# Patient Record
Sex: MALE | Race: WHITE | NOT HISPANIC OR LATINO | Employment: OTHER | URBAN - METROPOLITAN AREA
[De-identification: names, ages, dates, MRNs, and addresses within clinical notes are randomized per-mention and may not be internally consistent; named-entity substitution may affect disease eponyms.]

---

## 2021-05-28 ENCOUNTER — OFFICE VISIT (OUTPATIENT)
Dept: FAMILY MEDICINE | Facility: CLINIC | Age: 66
End: 2021-05-28
Payer: COMMERCIAL

## 2021-05-28 VITALS
WEIGHT: 260 LBS | OXYGEN SATURATION: 95 % | RESPIRATION RATE: 14 BRPM | HEIGHT: 66 IN | BODY MASS INDEX: 41.78 KG/M2 | HEART RATE: 65 BPM | SYSTOLIC BLOOD PRESSURE: 116 MMHG | DIASTOLIC BLOOD PRESSURE: 66 MMHG

## 2021-05-28 DIAGNOSIS — D64.9 CHRONIC ANEMIA: ICD-10-CM

## 2021-05-28 DIAGNOSIS — Z09 HOSPITAL DISCHARGE FOLLOW-UP: Primary | ICD-10-CM

## 2021-05-28 DIAGNOSIS — Z95.1 HX OF CABG: ICD-10-CM

## 2021-05-28 DIAGNOSIS — E61.1 IRON DEFICIENCY: ICD-10-CM

## 2021-05-28 DIAGNOSIS — N17.9 ACUTE KIDNEY INJURY: ICD-10-CM

## 2021-05-28 DIAGNOSIS — E11.9 TYPE 2 DIABETES MELLITUS WITHOUT COMPLICATION, WITH LONG-TERM CURRENT USE OF INSULIN: ICD-10-CM

## 2021-05-28 DIAGNOSIS — Z98.84 HISTORY OF ROUX-EN-Y GASTRIC BYPASS: ICD-10-CM

## 2021-05-28 DIAGNOSIS — E78.5 HYPERLIPIDEMIA, UNSPECIFIED HYPERLIPIDEMIA TYPE: ICD-10-CM

## 2021-05-28 DIAGNOSIS — Z79.4 TYPE 2 DIABETES MELLITUS WITHOUT COMPLICATION, WITH LONG-TERM CURRENT USE OF INSULIN: ICD-10-CM

## 2021-05-28 DIAGNOSIS — Z74.09 IMMOBILITY: ICD-10-CM

## 2021-05-28 DIAGNOSIS — M13.0 POLYARTHRITIS: ICD-10-CM

## 2021-05-28 DIAGNOSIS — I25.10 CORONARY ARTERY DISEASE, ANGINA PRESENCE UNSPECIFIED, UNSPECIFIED VESSEL OR LESION TYPE, UNSPECIFIED WHETHER NATIVE OR TRANSPLANTED HEART: ICD-10-CM

## 2021-05-28 LAB — HBA1C MFR BLD: 7.2 % (ref 4–6)

## 2021-05-28 PROCEDURE — 99205 PR OFFICE/OUTPT VISIT, NEW, LEVL V, 60-74 MIN: ICD-10-PCS | Mod: S$GLB,,, | Performed by: NURSE PRACTITIONER

## 2021-05-28 PROCEDURE — 99205 OFFICE O/P NEW HI 60 MIN: CPT | Mod: S$GLB,,, | Performed by: NURSE PRACTITIONER

## 2021-05-28 RX ORDER — GLIMEPIRIDE 4 MG/1
TABLET ORAL
COMMUNITY
Start: 2021-02-15 | End: 2021-06-29 | Stop reason: SDUPTHER

## 2021-05-28 RX ORDER — OMEPRAZOLE 20 MG/1
CAPSULE, DELAYED RELEASE ORAL
COMMUNITY
Start: 2021-04-12 | End: 2021-06-29 | Stop reason: SDUPTHER

## 2021-05-28 RX ORDER — FUROSEMIDE 40 MG/1
TABLET ORAL
COMMUNITY
Start: 2021-01-24 | End: 2021-06-29 | Stop reason: SDUPTHER

## 2021-05-28 RX ORDER — INSULIN GLARGINE 100 [IU]/ML
40 INJECTION, SOLUTION SUBCUTANEOUS DAILY
COMMUNITY
End: 2022-01-19 | Stop reason: SDUPTHER

## 2021-05-28 RX ORDER — INSULIN LISPRO 100 [IU]/ML
20 INJECTION, SOLUTION INTRAVENOUS; SUBCUTANEOUS
COMMUNITY
Start: 2021-05-11 | End: 2022-01-19 | Stop reason: SDUPTHER

## 2021-05-28 RX ORDER — DULOXETIN HYDROCHLORIDE 30 MG/1
CAPSULE, DELAYED RELEASE ORAL
COMMUNITY
Start: 2021-04-12 | End: 2021-06-29 | Stop reason: SDUPTHER

## 2021-05-28 RX ORDER — METFORMIN HYDROCHLORIDE 1000 MG/1
TABLET ORAL
COMMUNITY
Start: 2021-04-12 | End: 2021-06-29 | Stop reason: SDUPTHER

## 2021-05-28 RX ORDER — NAPROXEN SODIUM 220 MG/1
81 TABLET, FILM COATED ORAL DAILY
COMMUNITY
End: 2024-03-28

## 2021-05-28 RX ORDER — GABAPENTIN 300 MG/1
300 CAPSULE ORAL 3 TIMES DAILY
COMMUNITY
Start: 2021-05-24 | End: 2022-01-19 | Stop reason: SDUPTHER

## 2021-05-28 RX ORDER — ATORVASTATIN CALCIUM 40 MG/1
TABLET, FILM COATED ORAL
COMMUNITY
Start: 2021-02-15 | End: 2021-06-29 | Stop reason: SDUPTHER

## 2021-05-28 RX ORDER — CARVEDILOL 25 MG/1
TABLET ORAL
COMMUNITY
Start: 2021-02-15 | End: 2021-06-29 | Stop reason: SDUPTHER

## 2021-06-04 ENCOUNTER — TELEPHONE (OUTPATIENT)
Dept: FAMILY MEDICINE | Facility: CLINIC | Age: 66
End: 2021-06-04

## 2021-06-11 ENCOUNTER — OFFICE VISIT (OUTPATIENT)
Dept: FAMILY MEDICINE | Facility: CLINIC | Age: 66
End: 2021-06-11
Payer: COMMERCIAL

## 2021-06-11 DIAGNOSIS — R70.0 ESR RAISED: ICD-10-CM

## 2021-06-11 DIAGNOSIS — E83.42 HYPOMAGNESEMIA: ICD-10-CM

## 2021-06-11 DIAGNOSIS — M13.0 POLYARTHRITIS: ICD-10-CM

## 2021-06-11 DIAGNOSIS — D80.1 HYPOGAMMAGLOBULINEMIA: Primary | ICD-10-CM

## 2021-06-11 DIAGNOSIS — E11.9 TYPE 2 DIABETES MELLITUS WITHOUT COMPLICATION, WITH LONG-TERM CURRENT USE OF INSULIN: ICD-10-CM

## 2021-06-11 DIAGNOSIS — I25.10 CORONARY ARTERY DISEASE, ANGINA PRESENCE UNSPECIFIED, UNSPECIFIED VESSEL OR LESION TYPE, UNSPECIFIED WHETHER NATIVE OR TRANSPLANTED HEART: ICD-10-CM

## 2021-06-11 DIAGNOSIS — D64.9 CHRONIC ANEMIA: ICD-10-CM

## 2021-06-11 DIAGNOSIS — Z79.4 TYPE 2 DIABETES MELLITUS WITHOUT COMPLICATION, WITH LONG-TERM CURRENT USE OF INSULIN: ICD-10-CM

## 2021-06-11 PROCEDURE — 99214 PR OFFICE/OUTPT VISIT, EST, LEVL IV, 30-39 MIN: ICD-10-PCS | Mod: S$GLB,,, | Performed by: NURSE PRACTITIONER

## 2021-06-11 PROCEDURE — 99214 OFFICE O/P EST MOD 30 MIN: CPT | Mod: S$GLB,,, | Performed by: NURSE PRACTITIONER

## 2021-06-17 ENCOUNTER — TELEPHONE (OUTPATIENT)
Dept: FAMILY MEDICINE | Facility: CLINIC | Age: 66
End: 2021-06-17

## 2021-07-01 ENCOUNTER — PATIENT MESSAGE (OUTPATIENT)
Dept: FAMILY MEDICINE | Facility: CLINIC | Age: 66
End: 2021-07-01

## 2021-07-01 RX ORDER — CARVEDILOL 25 MG/1
25 TABLET ORAL 2 TIMES DAILY
Qty: 180 TABLET | Refills: 2 | Status: SHIPPED | OUTPATIENT
Start: 2021-07-01 | End: 2022-01-19 | Stop reason: SDUPTHER

## 2021-07-01 RX ORDER — GLIMEPIRIDE 4 MG/1
4 TABLET ORAL 2 TIMES DAILY
Qty: 180 TABLET | Refills: 2 | Status: SHIPPED | OUTPATIENT
Start: 2021-07-01 | End: 2022-01-19 | Stop reason: SDUPTHER

## 2021-07-01 RX ORDER — METFORMIN HYDROCHLORIDE 1000 MG/1
1000 TABLET ORAL 2 TIMES DAILY WITH MEALS
Qty: 180 TABLET | Refills: 3 | Status: SHIPPED | OUTPATIENT
Start: 2021-07-01 | End: 2022-01-19 | Stop reason: SDUPTHER

## 2021-07-01 RX ORDER — INSULIN GLARGINE 100 [IU]/ML
50 INJECTION, SOLUTION SUBCUTANEOUS DAILY
OUTPATIENT
Start: 2021-07-01

## 2021-07-01 RX ORDER — ATORVASTATIN CALCIUM 40 MG/1
40 TABLET, FILM COATED ORAL NIGHTLY
Qty: 90 TABLET | Refills: 3 | Status: SHIPPED | OUTPATIENT
Start: 2021-07-01 | End: 2022-01-19 | Stop reason: SDUPTHER

## 2021-07-01 RX ORDER — OMEPRAZOLE 20 MG/1
20 CAPSULE, DELAYED RELEASE ORAL 2 TIMES DAILY
Qty: 180 CAPSULE | Refills: 2 | Status: SHIPPED | OUTPATIENT
Start: 2021-07-01 | End: 2022-01-19 | Stop reason: SDUPTHER

## 2021-07-01 RX ORDER — GABAPENTIN 300 MG/1
CAPSULE ORAL
OUTPATIENT
Start: 2021-07-01

## 2021-07-01 RX ORDER — FUROSEMIDE 40 MG/1
40 TABLET ORAL DAILY
Qty: 90 TABLET | Refills: 1 | Status: SHIPPED | OUTPATIENT
Start: 2021-07-01 | End: 2021-12-15 | Stop reason: SDUPTHER

## 2021-07-01 RX ORDER — INSULIN LISPRO 100 [IU]/ML
INJECTION, SOLUTION INTRAVENOUS; SUBCUTANEOUS
OUTPATIENT
Start: 2021-07-01

## 2021-07-01 RX ORDER — DULOXETIN HYDROCHLORIDE 30 MG/1
CAPSULE, DELAYED RELEASE ORAL
Qty: 270 CAPSULE | Refills: 2 | Status: SHIPPED | OUTPATIENT
Start: 2021-07-01 | End: 2022-01-19 | Stop reason: SDUPTHER

## 2021-08-04 ENCOUNTER — PATIENT MESSAGE (OUTPATIENT)
Dept: ADMINISTRATIVE | Facility: HOSPITAL | Age: 66
End: 2021-08-04

## 2021-09-22 ENCOUNTER — PATIENT OUTREACH (OUTPATIENT)
Dept: ADMINISTRATIVE | Facility: HOSPITAL | Age: 66
End: 2021-09-22

## 2021-09-27 ENCOUNTER — OFFICE VISIT (OUTPATIENT)
Dept: FAMILY MEDICINE | Facility: CLINIC | Age: 66
End: 2021-09-27
Payer: COMMERCIAL

## 2021-09-27 VITALS
HEIGHT: 64 IN | HEART RATE: 71 BPM | OXYGEN SATURATION: 96 % | DIASTOLIC BLOOD PRESSURE: 74 MMHG | BODY MASS INDEX: 50.02 KG/M2 | SYSTOLIC BLOOD PRESSURE: 155 MMHG | WEIGHT: 293 LBS

## 2021-09-27 DIAGNOSIS — I25.10 CORONARY ARTERY DISEASE, ANGINA PRESENCE UNSPECIFIED, UNSPECIFIED VESSEL OR LESION TYPE, UNSPECIFIED WHETHER NATIVE OR TRANSPLANTED HEART: ICD-10-CM

## 2021-09-27 DIAGNOSIS — E11.22 TYPE 2 DIABETES MELLITUS WITH STAGE 3 CHRONIC KIDNEY DISEASE, WITH LONG-TERM CURRENT USE OF INSULIN, UNSPECIFIED WHETHER STAGE 3A OR 3B CKD: Primary | ICD-10-CM

## 2021-09-27 DIAGNOSIS — N18.30 TYPE 2 DIABETES MELLITUS WITH STAGE 3 CHRONIC KIDNEY DISEASE, WITH LONG-TERM CURRENT USE OF INSULIN, UNSPECIFIED WHETHER STAGE 3A OR 3B CKD: Primary | ICD-10-CM

## 2021-09-27 DIAGNOSIS — I10 ESSENTIAL HYPERTENSION: ICD-10-CM

## 2021-09-27 DIAGNOSIS — Z79.4 TYPE 2 DIABETES MELLITUS WITH STAGE 3 CHRONIC KIDNEY DISEASE, WITH LONG-TERM CURRENT USE OF INSULIN, UNSPECIFIED WHETHER STAGE 3A OR 3B CKD: Primary | ICD-10-CM

## 2021-09-27 PROCEDURE — 99214 PR OFFICE/OUTPT VISIT, EST, LEVL IV, 30-39 MIN: ICD-10-PCS | Mod: AQ,S$GLB,, | Performed by: FAMILY MEDICINE

## 2021-09-27 PROCEDURE — 99214 OFFICE O/P EST MOD 30 MIN: CPT | Mod: AQ,S$GLB,, | Performed by: FAMILY MEDICINE

## 2021-09-27 RX ORDER — NITROGLYCERIN 0.4 MG/1
0.4 TABLET SUBLINGUAL EVERY 5 MIN PRN
Qty: 25 TABLET | Refills: 0 | Status: SHIPPED | OUTPATIENT
Start: 2021-09-27 | End: 2022-01-19 | Stop reason: SDUPTHER

## 2021-09-27 RX ORDER — POLYETHYLENE GLYCOL 3350, SODIUM SULFATE ANHYDROUS, SODIUM BICARBONATE, SODIUM CHLORIDE, POTASSIUM CHLORIDE 236; 22.74; 6.74; 5.86; 2.97 G/4L; G/4L; G/4L; G/4L; G/4L
4 POWDER, FOR SOLUTION ORAL ONCE
Qty: 4000 ML | Refills: 0 | Status: SHIPPED | OUTPATIENT
Start: 2021-09-27 | End: 2021-09-27

## 2021-09-27 RX ORDER — FLASH GLUCOSE SENSOR
KIT MISCELLANEOUS
Qty: 6 KIT | Refills: 3 | Status: SHIPPED | OUTPATIENT
Start: 2021-09-27 | End: 2021-10-13 | Stop reason: SDUPTHER

## 2021-09-30 ENCOUNTER — TELEPHONE (OUTPATIENT)
Dept: FAMILY MEDICINE | Facility: CLINIC | Age: 66
End: 2021-09-30

## 2021-10-01 ENCOUNTER — PATIENT MESSAGE (OUTPATIENT)
Dept: FAMILY MEDICINE | Facility: CLINIC | Age: 66
End: 2021-10-01

## 2021-10-01 ENCOUNTER — PATIENT MESSAGE (OUTPATIENT)
Dept: ADMINISTRATIVE | Facility: HOSPITAL | Age: 66
End: 2021-10-01

## 2021-10-06 ENCOUNTER — PATIENT MESSAGE (OUTPATIENT)
Dept: FAMILY MEDICINE | Facility: CLINIC | Age: 66
End: 2021-10-06

## 2021-10-06 DIAGNOSIS — E11.9 TYPE 2 DIABETES MELLITUS WITHOUT COMPLICATION, UNSPECIFIED WHETHER LONG TERM INSULIN USE: ICD-10-CM

## 2021-10-07 RX ORDER — DAPAGLIFLOZIN 5 MG/1
5 TABLET, FILM COATED ORAL DAILY
Qty: 90 TABLET | Refills: 3 | Status: SHIPPED | OUTPATIENT
Start: 2021-10-07 | End: 2021-10-13 | Stop reason: SDUPTHER

## 2021-10-12 ENCOUNTER — PATIENT OUTREACH (OUTPATIENT)
Dept: ADMINISTRATIVE | Facility: HOSPITAL | Age: 66
End: 2021-10-12

## 2021-10-13 ENCOUNTER — PATIENT MESSAGE (OUTPATIENT)
Dept: FAMILY MEDICINE | Facility: CLINIC | Age: 66
End: 2021-10-13
Payer: COMMERCIAL

## 2021-10-13 DIAGNOSIS — E11.22 TYPE 2 DIABETES MELLITUS WITH STAGE 3 CHRONIC KIDNEY DISEASE, WITH LONG-TERM CURRENT USE OF INSULIN, UNSPECIFIED WHETHER STAGE 3A OR 3B CKD: ICD-10-CM

## 2021-10-13 DIAGNOSIS — N18.30 TYPE 2 DIABETES MELLITUS WITH STAGE 3 CHRONIC KIDNEY DISEASE, WITH LONG-TERM CURRENT USE OF INSULIN, UNSPECIFIED WHETHER STAGE 3A OR 3B CKD: ICD-10-CM

## 2021-10-13 DIAGNOSIS — Z79.4 TYPE 2 DIABETES MELLITUS WITH STAGE 3 CHRONIC KIDNEY DISEASE, WITH LONG-TERM CURRENT USE OF INSULIN, UNSPECIFIED WHETHER STAGE 3A OR 3B CKD: ICD-10-CM

## 2021-10-14 RX ORDER — DAPAGLIFLOZIN 5 MG/1
5 TABLET, FILM COATED ORAL DAILY
Qty: 90 TABLET | Refills: 3 | Status: SHIPPED | OUTPATIENT
Start: 2021-10-14 | End: 2022-01-19 | Stop reason: SDUPTHER

## 2021-10-14 RX ORDER — FLASH GLUCOSE SENSOR
KIT MISCELLANEOUS
Qty: 6 KIT | Refills: 3 | Status: SHIPPED | OUTPATIENT
Start: 2021-10-14 | End: 2022-01-19 | Stop reason: SDUPTHER

## 2021-10-25 LAB
COMMENTS: ABNORMAL
EST. AVERAGE GLUCOSE BLD GHB EST-MCNC: 209 MG/DL
HBA1C MFR BLD: 8.9 % (ref 4.8–5.6)

## 2021-11-19 ENCOUNTER — TELEPHONE (OUTPATIENT)
Dept: FAMILY MEDICINE | Facility: CLINIC | Age: 66
End: 2021-11-19
Payer: COMMERCIAL

## 2021-11-24 ENCOUNTER — PATIENT OUTREACH (OUTPATIENT)
Dept: ADMINISTRATIVE | Facility: HOSPITAL | Age: 66
End: 2021-11-24
Payer: COMMERCIAL

## 2021-11-30 ENCOUNTER — PATIENT MESSAGE (OUTPATIENT)
Dept: RESEARCH | Facility: HOSPITAL | Age: 66
End: 2021-11-30
Payer: COMMERCIAL

## 2021-12-01 ENCOUNTER — PATIENT MESSAGE (OUTPATIENT)
Dept: FAMILY MEDICINE | Facility: CLINIC | Age: 66
End: 2021-12-01
Payer: COMMERCIAL

## 2021-12-16 RX ORDER — FUROSEMIDE 40 MG/1
40 TABLET ORAL DAILY
Qty: 90 TABLET | Refills: 1 | Status: SHIPPED | OUTPATIENT
Start: 2021-12-16 | End: 2022-01-19 | Stop reason: SDUPTHER

## 2022-01-19 ENCOUNTER — OFFICE VISIT (OUTPATIENT)
Dept: FAMILY MEDICINE | Facility: CLINIC | Age: 67
End: 2022-01-19
Payer: COMMERCIAL

## 2022-01-19 VITALS
HEART RATE: 91 BPM | RESPIRATION RATE: 17 BRPM | DIASTOLIC BLOOD PRESSURE: 98 MMHG | SYSTOLIC BLOOD PRESSURE: 157 MMHG | OXYGEN SATURATION: 95 %

## 2022-01-19 DIAGNOSIS — E11.22 TYPE 2 DIABETES MELLITUS WITH STAGE 3 CHRONIC KIDNEY DISEASE, WITH LONG-TERM CURRENT USE OF INSULIN, UNSPECIFIED WHETHER STAGE 3A OR 3B CKD: ICD-10-CM

## 2022-01-19 DIAGNOSIS — I25.10 CORONARY ARTERY DISEASE, UNSPECIFIED VESSEL OR LESION TYPE, UNSPECIFIED WHETHER ANGINA PRESENT, UNSPECIFIED WHETHER NATIVE OR TRANSPLANTED HEART: ICD-10-CM

## 2022-01-19 DIAGNOSIS — E78.5 HYPERLIPIDEMIA, UNSPECIFIED HYPERLIPIDEMIA TYPE: ICD-10-CM

## 2022-01-19 DIAGNOSIS — G62.9 POLYNEUROPATHY: ICD-10-CM

## 2022-01-19 DIAGNOSIS — N18.30 TYPE 2 DIABETES MELLITUS WITH STAGE 3 CHRONIC KIDNEY DISEASE, WITH LONG-TERM CURRENT USE OF INSULIN, UNSPECIFIED WHETHER STAGE 3A OR 3B CKD: ICD-10-CM

## 2022-01-19 DIAGNOSIS — I10 PRIMARY HYPERTENSION: Primary | ICD-10-CM

## 2022-01-19 DIAGNOSIS — F41.1 GENERALIZED ANXIETY DISORDER: ICD-10-CM

## 2022-01-19 DIAGNOSIS — H66.90 OTITIS MEDIA, UNSPECIFIED LATERALITY, UNSPECIFIED OTITIS MEDIA TYPE: ICD-10-CM

## 2022-01-19 DIAGNOSIS — K21.9 GASTROESOPHAGEAL REFLUX DISEASE, UNSPECIFIED WHETHER ESOPHAGITIS PRESENT: ICD-10-CM

## 2022-01-19 DIAGNOSIS — Z79.4 TYPE 2 DIABETES MELLITUS WITH STAGE 3 CHRONIC KIDNEY DISEASE, WITH LONG-TERM CURRENT USE OF INSULIN, UNSPECIFIED WHETHER STAGE 3A OR 3B CKD: ICD-10-CM

## 2022-01-19 LAB
CTP QC/QA: YES
SARS-COV-2 RDRP RESP QL NAA+PROBE: NEGATIVE

## 2022-01-19 PROCEDURE — 99215 OFFICE O/P EST HI 40 MIN: CPT | Mod: S$GLB,,, | Performed by: FAMILY MEDICINE

## 2022-01-19 PROCEDURE — 99215 PR OFFICE/OUTPT VISIT, EST, LEVL V, 40-54 MIN: ICD-10-PCS | Mod: S$GLB,,, | Performed by: FAMILY MEDICINE

## 2022-01-19 PROCEDURE — U0002: ICD-10-PCS | Mod: QW,CR,S$GLB, | Performed by: FAMILY MEDICINE

## 2022-01-19 PROCEDURE — U0002 COVID-19 LAB TEST NON-CDC: HCPCS | Mod: QW,CR,S$GLB, | Performed by: FAMILY MEDICINE

## 2022-01-19 RX ORDER — INSULIN LISPRO 100 [IU]/ML
INJECTION, SOLUTION INTRAVENOUS; SUBCUTANEOUS
Qty: 10 ML | Refills: 3 | Status: SHIPPED | OUTPATIENT
Start: 2022-01-19 | End: 2022-12-14 | Stop reason: SDUPTHER

## 2022-01-19 RX ORDER — CARVEDILOL 25 MG/1
25 TABLET ORAL 2 TIMES DAILY
Qty: 180 TABLET | Refills: 2 | Status: SHIPPED | OUTPATIENT
Start: 2022-01-19 | End: 2023-02-27 | Stop reason: SDUPTHER

## 2022-01-19 RX ORDER — NITROGLYCERIN 0.4 MG/1
0.4 TABLET SUBLINGUAL EVERY 5 MIN PRN
Qty: 25 TABLET | Refills: 0 | Status: SHIPPED | OUTPATIENT
Start: 2022-01-19 | End: 2024-04-01

## 2022-01-19 RX ORDER — FLASH GLUCOSE SENSOR
KIT MISCELLANEOUS
Qty: 6 KIT | Refills: 3 | Status: SHIPPED | OUTPATIENT
Start: 2022-01-19 | End: 2022-12-05 | Stop reason: SDUPTHER

## 2022-01-19 RX ORDER — GLIMEPIRIDE 4 MG/1
4 TABLET ORAL 2 TIMES DAILY
Qty: 180 TABLET | Refills: 2 | Status: SHIPPED | OUTPATIENT
Start: 2022-01-19 | End: 2022-12-14 | Stop reason: SDUPTHER

## 2022-01-19 RX ORDER — DULOXETIN HYDROCHLORIDE 30 MG/1
CAPSULE, DELAYED RELEASE ORAL
Qty: 270 CAPSULE | Refills: 2 | Status: SHIPPED | OUTPATIENT
Start: 2022-01-19 | End: 2022-12-14 | Stop reason: SDUPTHER

## 2022-01-19 RX ORDER — ATORVASTATIN CALCIUM 40 MG/1
40 TABLET, FILM COATED ORAL NIGHTLY
Qty: 90 TABLET | Refills: 3 | Status: SHIPPED | OUTPATIENT
Start: 2022-01-19 | End: 2023-02-27 | Stop reason: SDUPTHER

## 2022-01-19 RX ORDER — GABAPENTIN 300 MG/1
300 CAPSULE ORAL 3 TIMES DAILY
Qty: 270 CAPSULE | Refills: 3 | Status: SHIPPED | OUTPATIENT
Start: 2022-01-19 | End: 2022-01-25 | Stop reason: SDUPTHER

## 2022-01-19 RX ORDER — FUROSEMIDE 40 MG/1
40 TABLET ORAL DAILY
Qty: 90 TABLET | Refills: 1 | Status: SHIPPED | OUTPATIENT
Start: 2022-01-19 | End: 2022-08-30 | Stop reason: SDUPTHER

## 2022-01-19 RX ORDER — CEFDINIR 300 MG/1
300 CAPSULE ORAL 2 TIMES DAILY
Qty: 14 CAPSULE | Refills: 0 | Status: SHIPPED | OUTPATIENT
Start: 2022-01-19 | End: 2022-01-26

## 2022-01-19 RX ORDER — DAPAGLIFLOZIN 5 MG/1
5 TABLET, FILM COATED ORAL DAILY
Qty: 90 TABLET | Refills: 3 | Status: SHIPPED | OUTPATIENT
Start: 2022-01-19 | End: 2023-02-27 | Stop reason: SDUPTHER

## 2022-01-19 RX ORDER — OLMESARTAN MEDOXOMIL 20 MG/1
20 TABLET ORAL DAILY
Qty: 90 TABLET | Refills: 1 | Status: SHIPPED | OUTPATIENT
Start: 2022-01-19 | End: 2022-01-19 | Stop reason: SDUPTHER

## 2022-01-19 RX ORDER — OMEPRAZOLE 20 MG/1
20 CAPSULE, DELAYED RELEASE ORAL 2 TIMES DAILY
Qty: 180 CAPSULE | Refills: 2 | Status: SHIPPED | OUTPATIENT
Start: 2022-01-19 | End: 2022-08-16

## 2022-01-19 RX ORDER — OLMESARTAN MEDOXOMIL 20 MG/1
20 TABLET ORAL DAILY
Qty: 90 TABLET | Refills: 1 | Status: SHIPPED | OUTPATIENT
Start: 2022-01-19 | End: 2022-08-30 | Stop reason: SDUPTHER

## 2022-01-19 RX ORDER — INSULIN GLARGINE 100 [IU]/ML
40 INJECTION, SOLUTION SUBCUTANEOUS DAILY
Qty: 10 ML | Refills: 5 | Status: SHIPPED | OUTPATIENT
Start: 2022-01-19 | End: 2023-02-27

## 2022-01-19 RX ORDER — METFORMIN HYDROCHLORIDE 1000 MG/1
1000 TABLET ORAL 2 TIMES DAILY WITH MEALS
Qty: 180 TABLET | Refills: 3 | Status: SHIPPED | OUTPATIENT
Start: 2022-01-19 | End: 2023-02-27 | Stop reason: SDUPTHER

## 2022-01-19 NOTE — PROGRESS NOTES
Subjective:       Patient ID: Jim Ryan is a 66 y.o. male.    Chief Complaint: Otalgia (Left ear pain that began yesterday. Patient reports runny nose/congestion. While blowing his nose he felt a pop to his left ear, along with severe pain. Denies cough/fever/SOB.)    HPI     New prob and f/u as below  Reports 1 day of L ear pain and congestion, rhinorrhea  Felt ear pop while blowing nose  No cough, fever, sob, wheezing  DM - doing well with freestyle peewee device, am fasting and general readings have improved. Will get a1c. Also trying diet modifications  HTN - uncontrolled per reading today, also elevated at specialist visit recently. asymp  GERD - controlled on ppi  On cymbalta for chronic pain, doing well  No cp, sob    Review of Systems   Constitutional: Negative for chills, diaphoresis, fatigue and fever.   HENT: Positive for nasal congestion, ear pain, postnasal drip and rhinorrhea. Negative for ear discharge, facial swelling, hearing loss, sinus pressure/congestion, sore throat, tinnitus, trouble swallowing and voice change.    Eyes: Negative for visual disturbance.   Respiratory: Negative for cough, chest tightness, shortness of breath and wheezing.    Cardiovascular: Negative for chest pain, palpitations and leg swelling.   Gastrointestinal: Negative for abdominal pain, constipation, diarrhea, nausea and vomiting.   Endocrine: Negative for polydipsia and polyuria.   Genitourinary: Negative for decreased urine volume, dysuria and frequency.   Musculoskeletal: Negative for arthralgias and myalgias.   Integumentary:  Negative for color change, pallor and rash.   Neurological: Negative for dizziness, syncope, weakness, light-headedness and headaches.   Psychiatric/Behavioral: Negative for dysphoric mood and sleep disturbance. The patient is not nervous/anxious.          Objective:      Physical Exam  Vitals reviewed.   Constitutional:       General: He is not in acute distress.     Appearance: He is  well-developed and well-nourished. He is not diaphoretic.   HENT:      Head: Normocephalic and atraumatic.      Right Ear: External ear normal.      Left Ear: External ear normal. Tympanic membrane is erythematous. Tympanic membrane is not perforated or bulging.      Nose: Nose normal.      Mouth/Throat:      Mouth: Oropharynx is clear and moist. Mucous membranes are moist.      Pharynx: Oropharynx is clear.   Eyes:      Conjunctiva/sclera: Conjunctivae normal.   Neck:      Thyroid: No thyromegaly.      Vascular: No JVD.   Cardiovascular:      Rate and Rhythm: Normal rate and regular rhythm.      Pulses: Intact distal pulses.      Heart sounds: No murmur heard.  No friction rub. No gallop.    Pulmonary:      Effort: Pulmonary effort is normal. No respiratory distress.      Breath sounds: Normal breath sounds. No stridor. No wheezing.   Abdominal:      General: Bowel sounds are normal. There is no distension.      Palpations: Abdomen is soft.      Tenderness: There is no abdominal tenderness.   Musculoskeletal:         General: No tenderness or edema. Normal range of motion.      Cervical back: Normal range of motion and neck supple.   Lymphadenopathy:      Cervical: No cervical adenopathy.   Skin:     General: Skin is warm and dry.      Coloration: Skin is not pale.      Findings: No erythema or rash.   Neurological:      Mental Status: He is alert and oriented to person, place, and time.         Assessment:       Problem List Items Addressed This Visit        Cardiac/Vascular    HLD (hyperlipidemia)    Relevant Medications    atorvastatin (LIPITOR) 40 MG tablet    Other Relevant Orders    Lipid Panel    Coronary artery disease    Relevant Medications    atorvastatin (LIPITOR) 40 MG tablet    carvediloL (COREG) 25 MG tablet    furosemide (LASIX) 40 MG tablet    nitroGLYCERIN (NITROSTAT) 0.4 MG SL tablet       Endocrine    DM (diabetes mellitus), type 2    Relevant Medications    dapagliflozin (FARXIGA) 5 mg Tab  tablet    glimepiride (AMARYL) 4 MG tablet    HUMALOG KWIKPEN INSULIN 100 unit/mL pen    insulin (LANTUS SOLOSTAR U-100 INSULIN) glargine 100 units/mL (3mL) SubQ pen    metFORMIN (GLUCOPHAGE) 1000 MG tablet    flash glucose sensor (FREESTYLE ALESIA 14 DAY SENSOR) Kit    Other Relevant Orders    Comprehensive Metabolic Panel    TSH    Hemoglobin A1C    Urinalysis      Other Visit Diagnoses     Primary hypertension    -  Primary    Relevant Medications    olmesartan (BENICAR) 20 MG tablet    carvediloL (COREG) 25 MG tablet    Other Relevant Orders    CBC Auto Differential    Otitis media, unspecified laterality, unspecified otitis media type        Relevant Medications    cefdinir (OMNICEF) 300 MG capsule    Other Relevant Orders    POCT COVID-19 Rapid Screening (Completed)    Generalized anxiety disorder        Relevant Medications    DULoxetine (CYMBALTA) 30 MG capsule    Polyneuropathy        Relevant Medications    gabapentin (NEURONTIN) 300 MG capsule    Gastroesophageal reflux disease, unspecified whether esophagitis present        Relevant Medications    omeprazole (PRILOSEC) 20 MG capsule          Plan:       Primary hypertension  -     Discontinue: olmesartan (BENICAR) 20 MG tablet; Take 1 tablet (20 mg total) by mouth once daily.  Dispense: 90 tablet; Refill: 1  -     olmesartan (BENICAR) 20 MG tablet; Take 1 tablet (20 mg total) by mouth once daily.  Dispense: 90 tablet; Refill: 1  -     carvediloL (COREG) 25 MG tablet; Take 1 tablet (25 mg total) by mouth 2 (two) times daily.  Dispense: 180 tablet; Refill: 2  -     CBC Auto Differential; Future; Expected date: 01/19/2022    Hyperlipidemia, unspecified hyperlipidemia type  -     atorvastatin (LIPITOR) 40 MG tablet; Take 1 tablet (40 mg total) by mouth every evening.  Dispense: 90 tablet; Refill: 3  -     Lipid Panel; Future; Expected date: 01/19/2022    Type 2 diabetes mellitus with stage 3 chronic kidney disease, with long-term current use of insulin,  unspecified whether stage 3a or 3b CKD  Comments:  will get all records, a1c 7. trying to get 2 week monitoring system for improved control  Orders:  -     dapagliflozin (FARXIGA) 5 mg Tab tablet; Take 1 tablet (5 mg total) by mouth once daily.  Dispense: 90 tablet; Refill: 3  -     glimepiride (AMARYL) 4 MG tablet; Take 1 tablet (4 mg total) by mouth 2 (two) times daily.  Dispense: 180 tablet; Refill: 2  -     HUMALOG KWIKPEN INSULIN 100 unit/mL pen; SSI with meals, max dose 20 units  Dispense: 10 mL; Refill: 3  -     insulin (LANTUS SOLOSTAR U-100 INSULIN) glargine 100 units/mL (3mL) SubQ pen; Inject 40 Units into the skin once daily.  Dispense: 10 mL; Refill: 5  -     metFORMIN (GLUCOPHAGE) 1000 MG tablet; Take 1 tablet (1,000 mg total) by mouth 2 (two) times daily with meals.  Dispense: 180 tablet; Refill: 3  -     flash glucose sensor (FREESTYLE ALESIA 14 DAY SENSOR) Kit; Use as directed  Dispense: 6 kit; Refill: 3  -     Comprehensive Metabolic Panel; Future; Expected date: 01/19/2022  -     TSH; Future; Expected date: 01/19/2022  -     Hemoglobin A1C; Future; Expected date: 01/19/2022  -     Urinalysis; Future; Expected date: 01/19/2022    Coronary artery disease  -     atorvastatin (LIPITOR) 40 MG tablet; Take 1 tablet (40 mg total) by mouth every evening.  Dispense: 90 tablet; Refill: 3  -     carvediloL (COREG) 25 MG tablet; Take 1 tablet (25 mg total) by mouth 2 (two) times daily.  Dispense: 180 tablet; Refill: 2  -     furosemide (LASIX) 40 MG tablet; Take 1 tablet (40 mg total) by mouth once daily.  Dispense: 90 tablet; Refill: 1  -     nitroGLYCERIN (NITROSTAT) 0.4 MG SL tablet; Place 1 tablet (0.4 mg total) under the tongue every 5 (five) minutes as needed for Chest pain.  Dispense: 25 tablet; Refill: 0    Otitis media, unspecified laterality, unspecified otitis media type  -     POCT COVID-19 Rapid Screening  -     cefdinir (OMNICEF) 300 MG capsule; Take 1 capsule (300 mg total) by mouth 2 (two) times  daily. for 7 days  Dispense: 14 capsule; Refill: 0    Generalized anxiety disorder  -     DULoxetine (CYMBALTA) 30 MG capsule; Take 1 tablet PO in am and 2 tablets PO in PM  Dispense: 270 capsule; Refill: 2    Polyneuropathy  -     gabapentin (NEURONTIN) 300 MG capsule; Take 1 capsule (300 mg total) by mouth 3 (three) times daily. 2 caps tID daily  Dispense: 270 capsule; Refill: 3    Gastroesophageal reflux disease, unspecified whether esophagitis present  -     omeprazole (PRILOSEC) 20 MG capsule; Take 1 capsule (20 mg total) by mouth 2 (two) times a day.  Dispense: 180 capsule; Refill: 2

## 2022-01-25 DIAGNOSIS — G62.9 POLYNEUROPATHY: ICD-10-CM

## 2022-01-25 RX ORDER — GABAPENTIN 300 MG/1
600 CAPSULE ORAL 3 TIMES DAILY
Qty: 540 CAPSULE | Refills: 3 | Status: SHIPPED | OUTPATIENT
Start: 2022-01-25 | End: 2023-02-27 | Stop reason: SDUPTHER

## 2022-01-25 RX ORDER — GABAPENTIN 300 MG/1
600 CAPSULE ORAL 3 TIMES DAILY
Qty: 540 CAPSULE | Refills: 3 | Status: SHIPPED | OUTPATIENT
Start: 2022-01-25 | End: 2022-01-25 | Stop reason: SDUPTHER

## 2022-02-16 ENCOUNTER — TELEPHONE (OUTPATIENT)
Dept: FAMILY MEDICINE | Facility: CLINIC | Age: 67
End: 2022-02-16
Payer: COMMERCIAL

## 2022-02-16 NOTE — TELEPHONE ENCOUNTER
----- Message from Leny Maradiaga sent at 2/16/2022  4:21 PM CST -----  Patient need to speak to nurse regarding a prescription for pen needles.Call back number [phone]. Moisess

## 2022-02-18 RX ORDER — PEN NEEDLE, DIABETIC 30 GX3/16"
NEEDLE, DISPOSABLE MISCELLANEOUS
Qty: 100 EACH | Refills: 5 | Status: SHIPPED | OUTPATIENT
Start: 2022-02-18 | End: 2022-03-02 | Stop reason: SDUPTHER

## 2022-03-02 DIAGNOSIS — E11.9 TYPE 2 DIABETES MELLITUS WITHOUT COMPLICATION, WITH LONG-TERM CURRENT USE OF INSULIN: Primary | ICD-10-CM

## 2022-03-02 DIAGNOSIS — Z79.4 TYPE 2 DIABETES MELLITUS WITHOUT COMPLICATION, WITH LONG-TERM CURRENT USE OF INSULIN: Primary | ICD-10-CM

## 2022-03-02 RX ORDER — PEN NEEDLE, DIABETIC 30 GX3/16"
NEEDLE, DISPOSABLE MISCELLANEOUS
Qty: 100 EACH | Refills: 5 | Status: SHIPPED | OUTPATIENT
Start: 2022-03-02

## 2022-04-16 ENCOUNTER — PATIENT MESSAGE (OUTPATIENT)
Dept: ADMINISTRATIVE | Facility: HOSPITAL | Age: 67
End: 2022-04-16
Payer: COMMERCIAL

## 2022-06-10 ENCOUNTER — TELEPHONE (OUTPATIENT)
Dept: FAMILY MEDICINE | Facility: CLINIC | Age: 67
End: 2022-06-10
Payer: COMMERCIAL

## 2022-06-10 NOTE — TELEPHONE ENCOUNTER
----- Message from Ruby Mcpherson LPN sent at 6/10/2022  4:15 PM CDT -----    ----- Message -----  From: Rozina Florence  Sent: 6/10/2022   4:11 PM CDT  To: Ginger Royal ([facility]) Staff    Jim Ryan is returning a missed call from the MA, please give him another call back [phone] (home)

## 2022-06-10 NOTE — TELEPHONE ENCOUNTER
Tried calling patient didn't get no answer , so I did leave a message for patient to give me a call back.

## 2022-07-15 RX ORDER — NIRMATRELVIR AND RITONAVIR 300-100 MG
KIT ORAL
Qty: 15 TABLET | Refills: 0 | Status: SHIPPED | OUTPATIENT
Start: 2022-07-15 | End: 2022-07-20

## 2022-07-25 DIAGNOSIS — I10 PRIMARY HYPERTENSION: Primary | ICD-10-CM

## 2022-07-25 DIAGNOSIS — Z12.5 PROSTATE CANCER SCREENING: ICD-10-CM

## 2022-07-25 DIAGNOSIS — E78.5 HYPERLIPIDEMIA, UNSPECIFIED HYPERLIPIDEMIA TYPE: ICD-10-CM

## 2022-07-25 DIAGNOSIS — E11.69 TYPE 2 DIABETES MELLITUS WITH OTHER SPECIFIED COMPLICATION, WITHOUT LONG-TERM CURRENT USE OF INSULIN: ICD-10-CM

## 2022-07-27 LAB
ALBUMIN SERPL BCP-MCNC: 3.5 G/DL (ref 3.4–5)
ALP SERPL-CCNC: 106 U/L (ref 45–117)
ALT SERPL W P-5'-P-CCNC: 21 U/L (ref 13–61)
ANION GAP SERPL CALC-SCNC: 8 MMOL/L (ref 2–15)
AST SERPL-CCNC: 13 U/L (ref 15–37)
BILIRUB SERPL-MCNC: 0.3 MG/DL
BUN SERPL-MCNC: 37 MG/DL (ref 7–18)
CALCIUM SERPL-MCNC: 9.3 MG/DL (ref 8.2–10.1)
CHLORIDE SERPL-SCNC: 105 MMOL/L (ref 96–110)
CHOLEST SERPL-MSCNC: 166 MG/DL
CO2 SERPL-SCNC: 28 MMOL/L (ref 21–32)
CREAT SERPL-MCNC: 1.5 MG/DL (ref 0.6–1.3)
EST. GFR  (AFRICAN AMERICAN): 55 ML/MIN/1.73 M2
EST. GFR  (NON AFRICAN AMERICAN): 48 ML/MIN/1.73 M2
GLUCOSE SERPL-MCNC: 113 MG/DL (ref 74–106)
HBA1C MFR BLD: 7.4 % (ref 4.2–5.9)
HDL/CHOLESTEROL RATIO: 3.8 %
HDLC SERPL-MCNC: 44 MG/DL (ref 40–50)
LDLC SERPL CALC-MCNC: 53 MG/DL
POTASSIUM SERPL-SCNC: 4.9 MMOL/L (ref 3.5–5.1)
PROT SNV-MCNC: 7.2 G/L (ref 6.4–8.2)
PSA: 0.61
SODIUM BLD-SCNC: 141 MMOL/L (ref 136–145)
TRIGL SERPL-MCNC: 347 MG/DL

## 2022-07-28 ENCOUNTER — OFFICE VISIT (OUTPATIENT)
Dept: FAMILY MEDICINE | Facility: CLINIC | Age: 67
End: 2022-07-28
Payer: COMMERCIAL

## 2022-07-28 ENCOUNTER — PATIENT MESSAGE (OUTPATIENT)
Dept: ADMINISTRATIVE | Facility: HOSPITAL | Age: 67
End: 2022-07-28
Payer: COMMERCIAL

## 2022-07-28 VITALS
SYSTOLIC BLOOD PRESSURE: 111 MMHG | HEART RATE: 91 BPM | RESPIRATION RATE: 17 BRPM | DIASTOLIC BLOOD PRESSURE: 72 MMHG | OXYGEN SATURATION: 98 %

## 2022-07-28 DIAGNOSIS — N18.30 TYPE 2 DIABETES MELLITUS WITH STAGE 3 CHRONIC KIDNEY DISEASE, WITH LONG-TERM CURRENT USE OF INSULIN, UNSPECIFIED WHETHER STAGE 3A OR 3B CKD: ICD-10-CM

## 2022-07-28 DIAGNOSIS — Z79.4 TYPE 2 DIABETES MELLITUS WITH STAGE 3 CHRONIC KIDNEY DISEASE, WITH LONG-TERM CURRENT USE OF INSULIN, UNSPECIFIED WHETHER STAGE 3A OR 3B CKD: ICD-10-CM

## 2022-07-28 DIAGNOSIS — I25.10 CORONARY ARTERY DISEASE, UNSPECIFIED VESSEL OR LESION TYPE, UNSPECIFIED WHETHER ANGINA PRESENT, UNSPECIFIED WHETHER NATIVE OR TRANSPLANTED HEART: ICD-10-CM

## 2022-07-28 DIAGNOSIS — I10 PRIMARY HYPERTENSION: Primary | ICD-10-CM

## 2022-07-28 DIAGNOSIS — K21.9 GASTROESOPHAGEAL REFLUX DISEASE, UNSPECIFIED WHETHER ESOPHAGITIS PRESENT: ICD-10-CM

## 2022-07-28 DIAGNOSIS — G62.9 POLYNEUROPATHY: ICD-10-CM

## 2022-07-28 DIAGNOSIS — E11.22 TYPE 2 DIABETES MELLITUS WITH STAGE 3 CHRONIC KIDNEY DISEASE, WITH LONG-TERM CURRENT USE OF INSULIN, UNSPECIFIED WHETHER STAGE 3A OR 3B CKD: ICD-10-CM

## 2022-07-28 DIAGNOSIS — Z12.11 COLON CANCER SCREENING: ICD-10-CM

## 2022-07-28 PROCEDURE — 99215 PR OFFICE/OUTPT VISIT, EST, LEVL V, 40-54 MIN: ICD-10-PCS | Mod: S$GLB,,, | Performed by: FAMILY MEDICINE

## 2022-07-28 PROCEDURE — 99215 OFFICE O/P EST HI 40 MIN: CPT | Mod: S$GLB,,, | Performed by: FAMILY MEDICINE

## 2022-07-28 NOTE — PROGRESS NOTES
"Subjective:       Patient ID: Jim Ryan is a 66 y.o. male.    Chief Complaint: Annual Exam, Muscle Pain (Patient states "Almost every night I get muscle cramps in his hands." Wants to discuss the possibility of getting an order for a motorized wheelchair. ), and Diabetes (Patient reports hypoglycemic episodes regularly at night lately. Wants to discuss lowering dosage of lantus. )    HPI     F/u chronic  Hx of gerd and due for c-scope - sending to Dr. Hunter Javier   DM - notes elevated values following meals, particularly after dinner but wakes up with low values - he did already lower lantus dose by 5 units  htn - well controlled on current  Hld/cad/pad - reviewed notes from cardiology, on statin, no cp  Pt notes cramps of hands, particularly at night. He was previously on k supplement before he was hospitalized for hyperkalemia  Needing motorized scooter - pt is entirely wheelchair bound and current is a refurbished unit that they purchased but no longer works and needing replacement    Review of Systems   Constitutional: Negative for chills, diaphoresis, fatigue and fever.   HENT: Negative for nasal congestion, rhinorrhea and sore throat.    Eyes: Negative for visual disturbance.   Respiratory: Negative for cough, chest tightness, shortness of breath and wheezing.    Cardiovascular: Negative for chest pain, palpitations and leg swelling.   Gastrointestinal: Negative for abdominal pain, constipation, diarrhea, nausea and vomiting.   Endocrine: Negative for polydipsia and polyuria.   Genitourinary: Negative for decreased urine volume, dysuria and frequency.   Musculoskeletal: Positive for arthralgias, back pain and myalgias.   Integumentary:  Negative for color change, pallor and rash.   Neurological: Positive for numbness. Negative for dizziness, syncope, weakness, light-headedness and headaches.   Psychiatric/Behavioral: Negative for dysphoric mood and sleep disturbance. The patient is not nervous/anxious. "          Objective:      Physical Exam  Vitals reviewed.   Constitutional:       General: He is not in acute distress.     Appearance: He is well-developed. He is not diaphoretic.   HENT:      Head: Normocephalic and atraumatic.      Right Ear: External ear normal.      Left Ear: External ear normal.      Nose: Nose normal.      Mouth/Throat:      Mouth: Mucous membranes are moist.      Pharynx: Oropharynx is clear.   Eyes:      General: No scleral icterus.     Conjunctiva/sclera: Conjunctivae normal.      Pupils: Pupils are equal, round, and reactive to light.   Neck:      Thyroid: No thyromegaly.      Vascular: No JVD.   Cardiovascular:      Rate and Rhythm: Normal rate and regular rhythm.      Heart sounds: Normal heart sounds. No murmur heard.    No friction rub. No gallop.   Pulmonary:      Effort: Pulmonary effort is normal. No respiratory distress.      Breath sounds: Normal breath sounds. No stridor. No wheezing, rhonchi or rales.   Abdominal:      General: Bowel sounds are normal. There is no distension.      Palpations: Abdomen is soft.      Tenderness: There is no abdominal tenderness.   Musculoskeletal:         General: No tenderness.      Cervical back: Normal range of motion and neck supple.      Right lower leg: No edema.      Left lower leg: No edema.   Lymphadenopathy:      Cervical: No cervical adenopathy.   Skin:     General: Skin is warm and dry.      Coloration: Skin is not pale.      Findings: No erythema or rash.   Neurological:      General: No focal deficit present.      Mental Status: He is alert and oriented to person, place, and time. Mental status is at baseline.   Psychiatric:         Mood and Affect: Mood normal.         Behavior: Behavior normal.         Thought Content: Thought content normal.         Judgment: Judgment normal.         Assessment:       Problem List Items Addressed This Visit        Cardiac/Vascular    Coronary artery disease    Primary hypertension - Primary        Endocrine    DM (diabetes mellitus), type 2      Other Visit Diagnoses     Gastroesophageal reflux disease, unspecified whether esophagitis present        Relevant Orders    Ambulatory referral/consult to Gastroenterology    Polyneuropathy        Relevant Medications    miscellaneous medical supply Kit    Colon cancer screening        Relevant Orders    Ambulatory referral/consult to Gastroenterology          Plan:       Primary hypertension  Comments:  well controlled on current    Type 2 diabetes mellitus with stage 3 chronic kidney disease, with long-term current use of insulin, unspecified whether stage 3a or 3b CKD  Comments:  halving dose on glimiperide and planning to increase farxiga next visit    Coronary artery disease, unspecified vessel or lesion type, unspecified whether angina present, unspecified whether native or transplanted heart  Comments:  reviewed note from cardiology, continue current medications    Gastroesophageal reflux disease, unspecified whether esophagitis present  -     Ambulatory referral/consult to Gastroenterology; Future; Expected date: 08/04/2022    Polyneuropathy  -     miscellaneous medical supply Kit; [employer] motorized scooter  Dispense: 1 kit; Refill: 0    Colon cancer screening  -     Ambulatory referral/consult to Gastroenterology; Future; Expected date: 08/04/2022

## 2022-08-01 ENCOUNTER — TELEPHONE (OUTPATIENT)
Dept: FAMILY MEDICINE | Facility: CLINIC | Age: 67
End: 2022-08-01
Payer: COMMERCIAL

## 2022-08-01 PROBLEM — I10 PRIMARY HYPERTENSION: Status: ACTIVE | Noted: 2022-08-01

## 2022-08-01 NOTE — TELEPHONE ENCOUNTER
----- Message from Maty Miles MD sent at 8/1/2022  8:51 AM CDT -----  I put in a miscellaneous medical suppkly order for electric wheelchair - can you fax to [employer] for them? I think that's how to do it

## 2022-08-02 ENCOUNTER — PATIENT OUTREACH (OUTPATIENT)
Dept: ADMINISTRATIVE | Facility: HOSPITAL | Age: 67
End: 2022-08-02
Payer: COMMERCIAL

## 2022-08-02 NOTE — PROGRESS NOTES
Working a1c gap report. Pt seen by PCP last week and labs were ordered. Email sent to [facility] to see if pt has completed.

## 2022-08-16 DIAGNOSIS — K21.9 GASTROESOPHAGEAL REFLUX DISEASE, UNSPECIFIED WHETHER ESOPHAGITIS PRESENT: Primary | ICD-10-CM

## 2022-08-16 RX ORDER — PANTOPRAZOLE SODIUM 40 MG/1
40 TABLET, DELAYED RELEASE ORAL DAILY
Qty: 90 TABLET | Refills: 3 | Status: SHIPPED | OUTPATIENT
Start: 2022-08-16 | End: 2022-12-14 | Stop reason: SDUPTHER

## 2022-08-19 ENCOUNTER — PATIENT OUTREACH (OUTPATIENT)
Dept: ADMINISTRATIVE | Facility: HOSPITAL | Age: 67
End: 2022-08-19
Payer: COMMERCIAL

## 2022-08-30 DIAGNOSIS — I25.10 CORONARY ARTERY DISEASE, UNSPECIFIED VESSEL OR LESION TYPE, UNSPECIFIED WHETHER ANGINA PRESENT, UNSPECIFIED WHETHER NATIVE OR TRANSPLANTED HEART: ICD-10-CM

## 2022-08-30 DIAGNOSIS — I10 PRIMARY HYPERTENSION: ICD-10-CM

## 2022-08-30 RX ORDER — FUROSEMIDE 40 MG/1
40 TABLET ORAL DAILY
Qty: 90 TABLET | Refills: 1 | Status: SHIPPED | OUTPATIENT
Start: 2022-08-30 | End: 2023-02-27 | Stop reason: SDUPTHER

## 2022-08-30 RX ORDER — OLMESARTAN MEDOXOMIL 20 MG/1
20 TABLET ORAL DAILY
Qty: 90 TABLET | Refills: 1 | Status: SHIPPED | OUTPATIENT
Start: 2022-08-30 | End: 2023-02-27 | Stop reason: SDUPTHER

## 2022-11-16 ENCOUNTER — PATIENT MESSAGE (OUTPATIENT)
Dept: ADMINISTRATIVE | Facility: HOSPITAL | Age: 67
End: 2022-11-16
Payer: COMMERCIAL

## 2022-11-22 ENCOUNTER — PATIENT MESSAGE (OUTPATIENT)
Dept: FAMILY MEDICINE | Facility: CLINIC | Age: 67
End: 2022-11-22
Payer: COMMERCIAL

## 2022-11-22 DIAGNOSIS — M54.9 BACK PAIN, UNSPECIFIED BACK LOCATION, UNSPECIFIED BACK PAIN LATERALITY, UNSPECIFIED CHRONICITY: ICD-10-CM

## 2022-11-22 DIAGNOSIS — M13.0 POLYARTHRITIS: Primary | ICD-10-CM

## 2022-12-07 ENCOUNTER — PATIENT MESSAGE (OUTPATIENT)
Dept: FAMILY MEDICINE | Facility: CLINIC | Age: 67
End: 2022-12-07
Payer: COMMERCIAL

## 2022-12-10 LAB
LEFT EYE DM RETINOPATHY: NEGATIVE
RIGHT EYE DM RETINOPATHY: NEGATIVE

## 2022-12-14 ENCOUNTER — PATIENT MESSAGE (OUTPATIENT)
Dept: FAMILY MEDICINE | Facility: CLINIC | Age: 67
End: 2022-12-14
Payer: COMMERCIAL

## 2022-12-14 ENCOUNTER — PATIENT OUTREACH (OUTPATIENT)
Dept: ADMINISTRATIVE | Facility: HOSPITAL | Age: 67
End: 2022-12-14
Payer: COMMERCIAL

## 2022-12-15 ENCOUNTER — PATIENT MESSAGE (OUTPATIENT)
Dept: FAMILY MEDICINE | Facility: CLINIC | Age: 67
End: 2022-12-15
Payer: COMMERCIAL

## 2022-12-16 ENCOUNTER — TELEPHONE (OUTPATIENT)
Dept: FAMILY MEDICINE | Facility: CLINIC | Age: 67
End: 2022-12-16
Payer: COMMERCIAL

## 2022-12-16 NOTE — TELEPHONE ENCOUNTER
Attempted to contact patient to inquire about what medication needs to be refilled. Verbally authorized pharmacy to fill medications. See previous note.

## 2022-12-16 NOTE — TELEPHONE ENCOUNTER
----- Message from Lydia Whatley sent at 12/16/2022 11:10 AM CST -----  Contact: self  Pt called and stated his diabetic medication was not filled and he need it refilled. Please call [phone]

## 2023-01-11 ENCOUNTER — OFFICE VISIT (OUTPATIENT)
Dept: FAMILY MEDICINE | Facility: CLINIC | Age: 68
End: 2023-01-11
Payer: COMMERCIAL

## 2023-01-11 VITALS
HEART RATE: 78 BPM | SYSTOLIC BLOOD PRESSURE: 139 MMHG | BODY MASS INDEX: 50.29 KG/M2 | OXYGEN SATURATION: 96 % | HEIGHT: 64 IN | DIASTOLIC BLOOD PRESSURE: 63 MMHG

## 2023-01-11 DIAGNOSIS — E61.1 IRON DEFICIENCY: ICD-10-CM

## 2023-01-11 DIAGNOSIS — Z79.4 TYPE 2 DIABETES MELLITUS WITHOUT COMPLICATION, WITH LONG-TERM CURRENT USE OF INSULIN: ICD-10-CM

## 2023-01-11 DIAGNOSIS — I10 PRIMARY HYPERTENSION: ICD-10-CM

## 2023-01-11 DIAGNOSIS — E11.9 TYPE 2 DIABETES MELLITUS WITHOUT COMPLICATION, WITH LONG-TERM CURRENT USE OF INSULIN: ICD-10-CM

## 2023-01-11 DIAGNOSIS — E78.5 HYPERLIPIDEMIA, UNSPECIFIED HYPERLIPIDEMIA TYPE: Primary | ICD-10-CM

## 2023-01-11 DIAGNOSIS — I25.10 CORONARY ARTERY DISEASE, UNSPECIFIED VESSEL OR LESION TYPE, UNSPECIFIED WHETHER ANGINA PRESENT, UNSPECIFIED WHETHER NATIVE OR TRANSPLANTED HEART: ICD-10-CM

## 2023-01-11 DIAGNOSIS — Z98.84 HISTORY OF ROUX-EN-Y GASTRIC BYPASS: ICD-10-CM

## 2023-01-11 DIAGNOSIS — D64.9 CHRONIC ANEMIA: ICD-10-CM

## 2023-01-11 DIAGNOSIS — E41 NUTRITIONAL MARASMUS: ICD-10-CM

## 2023-01-11 DIAGNOSIS — Z74.09 IMMOBILITY: ICD-10-CM

## 2023-01-11 DIAGNOSIS — Z95.1 HX OF CABG: ICD-10-CM

## 2023-01-11 DIAGNOSIS — M87.00 AVN (AVASCULAR NECROSIS OF BONE): ICD-10-CM

## 2023-01-11 PROCEDURE — 99215 PR OFFICE/OUTPT VISIT, EST, LEVL V, 40-54 MIN: ICD-10-PCS | Mod: S$GLB,,, | Performed by: PHYSICIAN ASSISTANT

## 2023-01-11 PROCEDURE — 99215 OFFICE O/P EST HI 40 MIN: CPT | Mod: S$GLB,,, | Performed by: PHYSICIAN ASSISTANT

## 2023-01-11 RX ORDER — SEMAGLUTIDE 1.34 MG/ML
0.25 INJECTION, SOLUTION SUBCUTANEOUS
Qty: 1 PEN | Refills: 0 | Status: SHIPPED | OUTPATIENT
Start: 2023-01-11 | End: 2023-02-10 | Stop reason: SDUPTHER

## 2023-01-11 NOTE — PROGRESS NOTES
Subjective:      Patient ID: Jim Ryan is a 67 y.o. male.    Chief Complaint: Follow-up (Patient is here for a follow up visit, and has no complaints at this time )      HPI  Pt is here today for followup and new complaints     New- DM has been uncontrolled.  Reports both highs and lows, states that he does not monitor his eating as much as he should.      Uses freestyle clifford .  Highs 250-300 daily post prandial.  Has had a few lows, around 65-69, nonsymptomatic. Usually between 3am-6am    Current regimen:  Farxiga 5mg daily  Amaryl 4mg BID  Metformin 1000 BID    Lantus 40 U at night  Humalog 15U at night (injects with dinner)      F/u chronic:  Gerd - well controlled on protonix     htn - well controlled on current regimen     Hld/cad/pad -on statin, checking lipids today.      CABG, 4 vessel- 2017, Dr. Malik did procedure.  Currently sees Dr. Paz.    Mobility, AVN - Uses wheelchair for mobility due to BL AVN  2005.  Had multiped failed attempts for replacement due to infections.     Gastric bypass - 2000, will check weight loss surg labs today    Chronic anemia - anemia of chronic disease and JAROD in past ,  checking labs today     Wellness:   Cscope 2021    Review of Systems   Constitutional:  Negative for activity change, appetite change, chills, fatigue and fever.   HENT:  Negative for nasal congestion, facial swelling and rhinorrhea.    Eyes:  Negative for pain and visual disturbance.   Respiratory:  Negative for cough, chest tightness and shortness of breath.    Cardiovascular:  Negative for chest pain, palpitations, leg swelling and claudication.   Gastrointestinal:  Negative for abdominal distention, abdominal pain, anal bleeding, blood in stool, change in bowel habit, constipation, diarrhea and change in bowel habit.   Genitourinary:  Negative for difficulty urinating, flank pain and frequency.   Integumentary:  Negative for rash.   Neurological:  Negative for dizziness, weakness, numbness and  headaches.   Psychiatric/Behavioral:  Negative for self-injury, sleep disturbance and suicidal ideas. The patient is not nervous/anxious.      Medication List with Changes/Refills   New Medications    SEMAGLUTIDE (OZEMPIC) 0.25 MG OR 0.5 MG(2 MG/1.5 ML) PEN INJECTOR    Inject 0.25 mg into the skin every 7 days. for 2 weeks. Then increase to .5mg every 7 days   Current Medications    ASPIRIN 81 MG CHEW    Take 81 mg by mouth once daily.    ATORVASTATIN (LIPITOR) 40 MG TABLET    Take 1 tablet (40 mg total) by mouth every evening.    CARVEDILOL (COREG) 25 MG TABLET    Take 1 tablet (25 mg total) by mouth 2 (two) times daily.    DAPAGLIFLOZIN (FARXIGA) 5 MG TAB TABLET    Take 1 tablet (5 mg total) by mouth once daily.    DULOXETINE (CYMBALTA) 30 MG CAPSULE    Take 1 tablet PO in am and 2 tablets PO in PM    FLASH GLUCOSE SENSOR (FREESTYLE ALESIA 14 DAY SENSOR) KIT    Use as directed    FUROSEMIDE (LASIX) 40 MG TABLET    Take 1 tablet (40 mg total) by mouth once daily.    GABAPENTIN (NEURONTIN) 300 MG CAPSULE    Take 2 capsules (600 mg total) by mouth 3 (three) times daily. 2 caps tID daily    GLIMEPIRIDE (AMARYL) 4 MG TABLET    Take 1 tablet (4 mg total) by mouth 2 (two) times daily.    HUMALOG KWIKPEN INSULIN 100 UNIT/ML PEN    SSI with meals, max dose 20 units    INSULIN (LANTUS SOLOSTAR U-100 INSULIN) GLARGINE 100 UNITS/ML (3ML) SUBQ PEN    Inject 40 Units into the skin once daily.    METFORMIN (GLUCOPHAGE) 1000 MG TABLET    Take 1 tablet (1,000 mg total) by mouth 2 (two) times daily with meals.    MISCELLANEOUS MEDICAL SUPPLY KIT    [employer] motorized scooter    NITROGLYCERIN (NITROSTAT) 0.4 MG SL TABLET    Place 1 tablet (0.4 mg total) under the tongue every 5 (five) minutes as needed for Chest pain.    OLMESARTAN (BENICAR) 20 MG TABLET    Take 1 tablet (20 mg total) by mouth once daily.    PANTOPRAZOLE (PROTONIX) 40 MG TABLET    Take 1 tablet (40 mg total) by mouth once daily.    PEN NEEDLE, DIABETIC (BD ULTRA-FINE  "SHORT PEN NEEDLE) 31 GAUGE X 5/16" NDLE    Use as directed        Objective:     Vitals:    01/11/23 0909   BP: 139/63   Pulse: 78   SpO2: 96%   Height: 5' 4" (1.626 m)        Physical Exam  Constitutional:       Appearance: Normal appearance. He is normal weight.   HENT:      Head: Normocephalic and atraumatic.      Nose: Nose normal.   Eyes:      Conjunctiva/sclera: Conjunctivae normal.      Comments: Eyes tracking normal on exam    Cardiovascular:      Rate and Rhythm: Normal rate and regular rhythm.      Pulses: Normal pulses.      Heart sounds: Normal heart sounds. No murmur heard.    No friction rub. No gallop.   Pulmonary:      Effort: Pulmonary effort is normal. No respiratory distress.      Breath sounds: Normal breath sounds. No stridor. No wheezing, rhonchi or rales.   Musculoskeletal:      Right lower leg: No edema.      Left lower leg: No edema.      Comments:  In wheel chair      Skin:     General: Skin is warm and dry.      Capillary Refill: Capillary refill takes less than 2 seconds.   Neurological:      Mental Status: He is alert and oriented to person, place, and time.   Psychiatric:         Mood and Affect: Mood normal.         Behavior: Behavior normal.         Thought Content: Thought content normal.         Judgment: Judgment normal.          Assessment & Plan:     Hyperlipidemia, unspecified hyperlipidemia type  -     CBC Auto Differential; Future; Expected date: 01/11/2023  -     Comprehensive Metabolic Panel; Future; Expected date: 01/11/2023  -     Hemoglobin A1C; Future; Expected date: 01/11/2023  -     Lipid Panel; Future; Expected date: 01/11/2023  -     TSH w/reflex to FT4; Future; Expected date: 01/11/2023  -     Urinalysis, Reflex to Urine Culture Urine, Clean Catch; Future; Expected date: 01/12/2023  -     Vitamin B12/folate, serum panel; Future; Expected date: 01/11/2023  -     Vitamin B1; Future; Expected date: 01/11/2023  -     Iron, TIBC and Ferritin Panel; Future; Expected date: " 01/11/2023  -     Microalbumin/creatinine urine ratio  -     Calcitriol (1,25 DI-OH Vitamin D); Future; Expected date: 01/11/2023    Hx of CABG  -     CBC Auto Differential; Future; Expected date: 01/11/2023  -     Comprehensive Metabolic Panel; Future; Expected date: 01/11/2023  -     Hemoglobin A1C; Future; Expected date: 01/11/2023  -     Lipid Panel; Future; Expected date: 01/11/2023  -     TSH w/reflex to FT4; Future; Expected date: 01/11/2023  -     Urinalysis, Reflex to Urine Culture Urine, Clean Catch; Future; Expected date: 01/12/2023  -     Vitamin B12/folate, serum panel; Future; Expected date: 01/11/2023  -     Vitamin B1; Future; Expected date: 01/11/2023  -     Iron, TIBC and Ferritin Panel; Future; Expected date: 01/11/2023  -     Microalbumin/creatinine urine ratio  -     Calcitriol (1,25 DI-OH Vitamin D); Future; Expected date: 01/11/2023    Coronary artery disease, unspecified vessel or lesion type, unspecified whether angina present, unspecified whether native or transplanted heart  -     CBC Auto Differential; Future; Expected date: 01/11/2023  -     Comprehensive Metabolic Panel; Future; Expected date: 01/11/2023  -     Hemoglobin A1C; Future; Expected date: 01/11/2023  -     Lipid Panel; Future; Expected date: 01/11/2023  -     TSH w/reflex to FT4; Future; Expected date: 01/11/2023  -     Urinalysis, Reflex to Urine Culture Urine, Clean Catch; Future; Expected date: 01/12/2023  -     Vitamin B12/folate, serum panel; Future; Expected date: 01/11/2023  -     Vitamin B1; Future; Expected date: 01/11/2023  -     Iron, TIBC and Ferritin Panel; Future; Expected date: 01/11/2023  -     Microalbumin/creatinine urine ratio  -     Calcitriol (1,25 DI-OH Vitamin D); Future; Expected date: 01/11/2023    Primary hypertension  -     CBC Auto Differential; Future; Expected date: 01/11/2023  -     Comprehensive Metabolic Panel; Future; Expected date: 01/11/2023  -     Hemoglobin A1C; Future; Expected date:  01/11/2023  -     Lipid Panel; Future; Expected date: 01/11/2023  -     TSH w/reflex to FT4; Future; Expected date: 01/11/2023  -     Urinalysis, Reflex to Urine Culture Urine, Clean Catch; Future; Expected date: 01/12/2023  -     Vitamin B12/folate, serum panel; Future; Expected date: 01/11/2023  -     Vitamin B1; Future; Expected date: 01/11/2023  -     Iron, TIBC and Ferritin Panel; Future; Expected date: 01/11/2023  -     Microalbumin/creatinine urine ratio  -     Calcitriol (1,25 DI-OH Vitamin D); Future; Expected date: 01/11/2023    Chronic anemia  -     CBC Auto Differential; Future; Expected date: 01/11/2023  -     Comprehensive Metabolic Panel; Future; Expected date: 01/11/2023  -     Hemoglobin A1C; Future; Expected date: 01/11/2023  -     Lipid Panel; Future; Expected date: 01/11/2023  -     TSH w/reflex to FT4; Future; Expected date: 01/11/2023  -     Urinalysis, Reflex to Urine Culture Urine, Clean Catch; Future; Expected date: 01/12/2023  -     Vitamin B12/folate, serum panel; Future; Expected date: 01/11/2023  -     Vitamin B1; Future; Expected date: 01/11/2023  -     Iron, TIBC and Ferritin Panel; Future; Expected date: 01/11/2023  -     Microalbumin/creatinine urine ratio  -     Calcitriol (1,25 DI-OH Vitamin D); Future; Expected date: 01/11/2023    Iron deficiency  -     CBC Auto Differential; Future; Expected date: 01/11/2023  -     Comprehensive Metabolic Panel; Future; Expected date: 01/11/2023  -     Hemoglobin A1C; Future; Expected date: 01/11/2023  -     Lipid Panel; Future; Expected date: 01/11/2023  -     TSH w/reflex to FT4; Future; Expected date: 01/11/2023  -     Urinalysis, Reflex to Urine Culture Urine, Clean Catch; Future; Expected date: 01/12/2023  -     Vitamin B12/folate, serum panel; Future; Expected date: 01/11/2023  -     Vitamin B1; Future; Expected date: 01/11/2023  -     Iron, TIBC and Ferritin Panel; Future; Expected date: 01/11/2023  -     Microalbumin/creatinine urine  ratio  -     Calcitriol (1,25 DI-OH Vitamin D); Future; Expected date: 01/11/2023    Type 2 diabetes mellitus without complication, with long-term current use of insulin  -     semaglutide (OZEMPIC) 0.25 mg or 0.5 mg(2 mg/1.5 mL) pen injector; Inject 0.25 mg into the skin every 7 days. for 2 weeks. Then increase to .5mg every 7 days  Dispense: 1 pen; Refill: 0  -     CBC Auto Differential; Future; Expected date: 01/11/2023  -     Comprehensive Metabolic Panel; Future; Expected date: 01/11/2023  -     Hemoglobin A1C; Future; Expected date: 01/11/2023  -     Lipid Panel; Future; Expected date: 01/11/2023  -     TSH w/reflex to FT4; Future; Expected date: 01/11/2023  -     Urinalysis, Reflex to Urine Culture Urine, Clean Catch; Future; Expected date: 01/12/2023  -     Vitamin B12/folate, serum panel; Future; Expected date: 01/11/2023  -     Vitamin B1; Future; Expected date: 01/11/2023  -     Iron, TIBC and Ferritin Panel; Future; Expected date: 01/11/2023  -     Microalbumin/creatinine urine ratio  -     Calcitriol (1,25 DI-OH Vitamin D); Future; Expected date: 01/11/2023    History of Lluvia-en-Y gastric bypass  -     CBC Auto Differential; Future; Expected date: 01/11/2023  -     Comprehensive Metabolic Panel; Future; Expected date: 01/11/2023  -     Hemoglobin A1C; Future; Expected date: 01/11/2023  -     Lipid Panel; Future; Expected date: 01/11/2023  -     TSH w/reflex to FT4; Future; Expected date: 01/11/2023  -     Urinalysis, Reflex to Urine Culture Urine, Clean Catch; Future; Expected date: 01/12/2023  -     Vitamin B12/folate, serum panel; Future; Expected date: 01/11/2023  -     Vitamin B1; Future; Expected date: 01/11/2023  -     Iron, TIBC and Ferritin Panel; Future; Expected date: 01/11/2023  -     Microalbumin/creatinine urine ratio  -     Calcitriol (1,25 DI-OH Vitamin D); Future; Expected date: 01/11/2023    Nutritional marasmus  -     Vitamin B12/folate, serum panel; Future; Expected date:  01/11/2023    Immobility  Comments:  work on diet and exercise to control weight and aid in mobility over time     Body mass index (BMI) 39.0-39.9, adult   Comments:  work on diet and exercise to control weight and aid in mobility over time     AVN (avascular necrosis of bone)  Comments:  work on diet and exercise to control weight and aid in mobility over time       Adding ozempic today,  pt will only take night time humalog if he has a large dinner.  Goal will be to first eliminate regular insulin if he does well on ozempic.  Counseled on s/s of lows.  Will keep close followup     1 mo followup    Total time spent with patient in room and charting = 40 min     -Patient instructed that our office calls back on all labs and imaging within 1 week of receiving the results. Patient instructed to reach out to our office if they have not heard from us so that we can request the results from the lab/imaging center           Araceli Johnston PA-C

## 2023-01-13 LAB
%TRANSFERRIN SATURATION: 9.4 % (ref 20–50)
ABS NRBC COUNT: 0 X 10 3/UL (ref 0–0.01)
ABSOLUTE BASOPHIL: 0.05 X 10 3/UL (ref 0–0.22)
ABSOLUTE EOSINOPHIL: 0.36 X 10 3/UL (ref 0.04–0.54)
ABSOLUTE IMMATURE GRAN: 0.02 X 10 3/UL (ref 0–0.04)
ABSOLUTE LYMPHOCYTE: 2.43 X 10 3/UL (ref 0.86–4.75)
ABSOLUTE MONOCYTE: 0.64 X 10 3/UL (ref 0.22–1.08)
ADDITIONAL TESTING: NORMAL
ALBUMIN SERPL-MCNC: 4.2 G/DL (ref 3.5–5.2)
ALBUMIN/GLOB SERPL ELPH: 1.6 {RATIO} (ref 1–2.7)
ALP ISOS SERPL LEV INH-CCNC: 82 U/L (ref 40–130)
ALT (SGPT): 14 U/L (ref 0–41)
AMORPH URATE CRY URNS QL MICRO: NEGATIVE
ANION GAP SERPL CALC-SCNC: 12 MMOL/L (ref 8–17)
AST SERPL-CCNC: 13 U/L (ref 0–40)
B12: 258 PG/ML (ref 232–1245)
BACTERIA #/AREA URNS HPF: NEGATIVE /[HPF]
BASOPHILS NFR BLD: 0.7 % (ref 0.2–1.2)
BILIRUB UR QL STRIP: NEGATIVE
BILIRUBIN, TOTAL: 0.28 MG/DL (ref 0–1.2)
BUN/CREAT SERPL: 15.2 (ref 6–20)
CALCIUM SERPL-MCNC: 9.8 MG/DL (ref 8.6–10.2)
CARBON DIOXIDE, CO2: 32 MMOL/L (ref 22–29)
CHLORIDE: 99 MMOL/L (ref 98–107)
CHOLEST SERPL-MSCNC: 157 MG/DL (ref 100–200)
CLARITY UR: CLEAR
COLOR UR: YELLOW
CREAT SERPL-MCNC: 1.24 MG/DL (ref 0.7–1.2)
EOSINOPHIL NFR BLD: 4.7 % (ref 0.7–7)
EPITHELIAL CELLS: ABNORMAL
ESTIMATED AVERAGE GLUCOSE: 192 MG/DL
FERRITIN: 15.9 NG/ML (ref 20–380)
FOLATE SERPL-MCNC: 14 NG/ML (ref 5.6–45.8)
GFR ESTIMATION: 64.12
GLOBULIN: 2.6 G/DL (ref 1.5–4.5)
GLUCOSE (UA): 1000 MG/DL
GLUCOSE: 141 MG/DL (ref 82–115)
HBA1C MFR BLD: 8.3 % (ref 4–6)
HCT VFR BLD AUTO: 38 % (ref 42–52)
HDLC SERPL-MCNC: 46 MG/DL
HGB BLD-MCNC: 11.2 G/DL (ref 14–18)
IMMATURE GRANULOCYTES: 0.3 % (ref 0–0.5)
IRON BINDING CAPACITY: 373 UG/DL (ref 262–472)
IRON SERPL-MCNC: 35 UG/DL (ref 59–158)
KETONES UR QL STRIP: NEGATIVE MG/DL
LDL/HDL RATIO: 1.5 (ref 1–3)
LDLC SERPL CALC-MCNC: 68.8 MG/DL (ref 0–100)
LEUKOCYTE ESTERASE UR QL STRIP: NEGATIVE
LYMPHOCYTES NFR BLD: 32 % (ref 19.3–53.1)
MCH RBC QN AUTO: 21.5 PG (ref 27–32)
MCHC RBC AUTO-ENTMCNC: 29.5 G/DL (ref 32–36)
MCV RBC AUTO: 72.8 FL (ref 80–94)
MONOCYTES NFR BLD: 8.4 % (ref 4.7–12.5)
MUCOUS THREADS URNS QL MICRO: NEGATIVE
NEUTROPHILS # BLD AUTO: 4.1 X 10 3/UL (ref 2.15–7.56)
NEUTROPHILS NFR BLD: 53.9 % (ref 34–71.1)
NITRITE UR QL STRIP: NEGATIVE
NUCLEATED RED BLOOD CELLS: 0 /100 WBC (ref 0–0.2)
OCCULT BLOOD: NEGATIVE
PH, URINE: 6 (ref 5–7.5)
PLATELET # BLD AUTO: 268 X 10 3/UL (ref 135–400)
POTASSIUM: 4.2 MMOL/L (ref 3.5–5.1)
PROT SNV-MCNC: 6.8 G/DL (ref 6.4–8.3)
PROT UR QL STRIP: 25 MG/DL
RBC # BLD AUTO: 5.22 X 10 6/UL (ref 4.7–6.1)
RBC/HPF: NEGATIVE
RDW-SD: 44.7 FL (ref 37–54)
SODIUM: 143 MMOL/L (ref 136–145)
SP GR UR STRIP: 1.02 (ref 1–1.03)
TRIGL SERPL-MCNC: 211 MG/DL (ref 0–150)
TSH W/REFLEX TO FT4: 3.96 UIU/ML (ref 0.27–4.2)
UIBC SERPL-MCNC: 338 UG/DL (ref 112–346)
UREA NITROGEN (BUN): 18.9 MG/DL (ref 8–23)
UROBILINOGEN, URINE: NORMAL E.U./DL (ref 0–1)
WBC # BLD: 7.6 X 10 3/UL (ref 4.3–10.8)
WBC/HPF: ABNORMAL

## 2023-01-18 LAB — VITAMIN B1: 12 NMOL/L (ref 8–30)

## 2023-01-31 ENCOUNTER — OFFICE VISIT (OUTPATIENT)
Dept: FAMILY MEDICINE | Facility: CLINIC | Age: 68
End: 2023-01-31
Payer: COMMERCIAL

## 2023-01-31 VITALS
HEART RATE: 102 BPM | OXYGEN SATURATION: 97 % | SYSTOLIC BLOOD PRESSURE: 128 MMHG | DIASTOLIC BLOOD PRESSURE: 69 MMHG | RESPIRATION RATE: 17 BRPM

## 2023-01-31 DIAGNOSIS — Z79.4 TYPE 2 DIABETES MELLITUS WITHOUT COMPLICATION, WITH LONG-TERM CURRENT USE OF INSULIN: Primary | ICD-10-CM

## 2023-01-31 DIAGNOSIS — E61.1 IRON DEFICIENCY: ICD-10-CM

## 2023-01-31 DIAGNOSIS — E53.8 B12 DEFICIENCY: ICD-10-CM

## 2023-01-31 DIAGNOSIS — E11.9 TYPE 2 DIABETES MELLITUS WITHOUT COMPLICATION, WITH LONG-TERM CURRENT USE OF INSULIN: Primary | ICD-10-CM

## 2023-01-31 DIAGNOSIS — D64.9 CHRONIC ANEMIA: ICD-10-CM

## 2023-01-31 DIAGNOSIS — Z98.84 HISTORY OF ROUX-EN-Y GASTRIC BYPASS: ICD-10-CM

## 2023-01-31 PROCEDURE — 99214 PR OFFICE/OUTPT VISIT, EST, LEVL IV, 30-39 MIN: ICD-10-PCS | Mod: S$GLB,,, | Performed by: PHYSICIAN ASSISTANT

## 2023-01-31 PROCEDURE — 99214 OFFICE O/P EST MOD 30 MIN: CPT | Mod: S$GLB,,, | Performed by: PHYSICIAN ASSISTANT

## 2023-01-31 RX ORDER — VITAMIN E 268 MG
1 CAPSULE ORAL DAILY
Qty: 90 EACH | Refills: 2 | Status: SHIPPED | OUTPATIENT
Start: 2023-01-31 | End: 2023-02-10 | Stop reason: SDUPTHER

## 2023-01-31 NOTE — PROGRESS NOTES
Subjective:      Patient ID: Jim Ryan is a 67 y.o. male.    Chief Complaint: Follow-up      HPI  Patient is here today for follow-up.      Type 2 diabetes-No lows in 2 weeks,Stopped humalog  Currently on 3rd dose of ozempic.   No readings greater then 200 in the past 7 days .     Current regimen:  Farxiga 5mg daily  Amaryl 4mg BID  Metformin 1000 BID   Lantus 40 U at night, only injects if he has a large meal  Ozempic 0.5 mg weekly      Recent  Labs:  Iron deficient-will start patient on iron and vitamin-C    Anemia-patient with anemia which appears to be chronic microcytic in nature.  Likely a mixture of iron deficiency and chronic disease     Creatinine -1.24, GFR at 64.  Will recheck with patient hydrated     A1c 8.3     Triglycerides 211     [id number].  Pt has at home B12 supplements .  Will start today      Review of Systems   Constitutional:  Negative for activity change, appetite change, chills, diaphoresis, fatigue and unexpected weight change.   Eyes:  Negative for visual disturbance.   Respiratory:  Negative for cough, chest tightness, shortness of breath and wheezing.    Cardiovascular:  Negative for chest pain, palpitations and leg swelling.   Gastrointestinal:  Negative for abdominal pain, constipation, nausea and vomiting.   Neurological:  Negative for dizziness, vertigo, tremors, syncope, weakness, light-headedness, numbness and headaches.   Psychiatric/Behavioral:  Negative for agitation, behavioral problems, confusion, decreased concentration, dysphoric mood, hallucinations, self-injury, sleep disturbance and suicidal ideas. The patient is not nervous/anxious and is not hyperactive.      Medication List with Changes/Refills   New Medications    ASCORBIC ACID, VITAMIN C, (VITAMIN C) 500 MG TBSR    Take 1 tablet by mouth once daily.    FERROUS SULFATE, DRIED (SLOW FE) 160 MG (50 MG IRON) TBSR    Take 1 tablet (160 mg total) by mouth once daily.   Current Medications    ASPIRIN 81 MG CHEW    " Take 81 mg by mouth once daily.    ATORVASTATIN (LIPITOR) 40 MG TABLET    Take 1 tablet (40 mg total) by mouth every evening.    CARVEDILOL (COREG) 25 MG TABLET    Take 1 tablet (25 mg total) by mouth 2 (two) times daily.    DAPAGLIFLOZIN (FARXIGA) 5 MG TAB TABLET    Take 1 tablet (5 mg total) by mouth once daily.    DULOXETINE (CYMBALTA) 30 MG CAPSULE    Take 1 tablet PO in am and 2 tablets PO in PM    FLASH GLUCOSE SENSOR (FREESTYLE ALESIA 14 DAY SENSOR) KIT    Use as directed    FUROSEMIDE (LASIX) 40 MG TABLET    Take 1 tablet (40 mg total) by mouth once daily.    GABAPENTIN (NEURONTIN) 300 MG CAPSULE    Take 2 capsules (600 mg total) by mouth 3 (three) times daily. 2 caps tID daily    GLIMEPIRIDE (AMARYL) 4 MG TABLET    Take 1 tablet (4 mg total) by mouth 2 (two) times daily.    HUMALOG KWIKPEN INSULIN 100 UNIT/ML PEN    SSI with meals, max dose 20 units    INSULIN (LANTUS SOLOSTAR U-100 INSULIN) GLARGINE 100 UNITS/ML (3ML) SUBQ PEN    Inject 40 Units into the skin once daily.    METFORMIN (GLUCOPHAGE) 1000 MG TABLET    Take 1 tablet (1,000 mg total) by mouth 2 (two) times daily with meals.    MISCELLANEOUS MEDICAL SUPPLY KIT    [facility] motorized scooter    NITROGLYCERIN (NITROSTAT) 0.4 MG SL TABLET    Place 1 tablet (0.4 mg total) under the tongue every 5 (five) minutes as needed for Chest pain.    OLMESARTAN (BENICAR) 20 MG TABLET    Take 1 tablet (20 mg total) by mouth once daily.    PANTOPRAZOLE (PROTONIX) 40 MG TABLET    Take 1 tablet (40 mg total) by mouth once daily.    PEN NEEDLE, DIABETIC (BD ULTRA-FINE SHORT PEN NEEDLE) 31 GAUGE X 5/16" NDLE    Use as directed    SEMAGLUTIDE (OZEMPIC) 0.25 MG OR 0.5 MG(2 MG/1.5 ML) PEN INJECTOR    Inject 0.25 mg into the skin every 7 days. for 2 weeks. Then increase to .5mg every 7 days        Objective:     Vitals:    01/31/23 0950   BP: 128/69   Pulse: 102   Resp: 17   SpO2: 97%        Physical Exam  Constitutional:       Appearance: Normal appearance. He is normal " weight.   HENT:      Head: Normocephalic and atraumatic.      Nose: Nose normal.   Eyes:      Conjunctiva/sclera: Conjunctivae normal.      Comments: Eyes tracking normal on exam    Cardiovascular:      Rate and Rhythm: Normal rate and regular rhythm.      Pulses: Normal pulses.      Heart sounds: Normal heart sounds. No murmur heard.    No friction rub. No gallop.   Pulmonary:      Effort: Pulmonary effort is normal. No respiratory distress.      Breath sounds: Normal breath sounds. No stridor. No wheezing, rhonchi or rales.   Musculoskeletal:      Right lower leg: No edema.      Left lower leg: No edema.      Comments: Pt uses wheel chair to ambulate      Skin:     General: Skin is warm and dry.      Capillary Refill: Capillary refill takes less than 2 seconds.   Neurological:      Mental Status: He is alert and oriented to person, place, and time.   Psychiatric:         Mood and Affect: Mood normal.         Behavior: Behavior normal.         Thought Content: Thought content normal.         Judgment: Judgment normal.          Assessment & Plan:     Type 2 diabetes mellitus without complication, with long-term current use of insulin  Comments:  continue mounjaro, will titrate up over time     Iron deficiency  -     ferrous sulfate, dried (SLOW FE) 160 mg (50 mg iron) TbSR; Take 1 tablet (160 mg total) by mouth once daily.  Dispense: 90 tablet; Refill: 1  -     ascorbic acid, vitamin C, (VITAMIN C) 500 mg TbSR; Take 1 tablet by mouth once daily.  Dispense: 90 each; Refill: 2    Chronic anemia  -     ferrous sulfate, dried (SLOW FE) 160 mg (50 mg iron) TbSR; Take 1 tablet (160 mg total) by mouth once daily.  Dispense: 90 tablet; Refill: 1  -     ascorbic acid, vitamin C, (VITAMIN C) 500 mg TbSR; Take 1 tablet by mouth once daily.  Dispense: 90 each; Refill: 2    History of Lluvia-en-Y gastric bypass    B12 deficiency  Comments:  start at home b12        Follow ups :  CMP in 1 mo   B12 in 3 mo  Iron in  mo   A1c 3 mo    Followup with Ana to establish care.     -Patient instructed that our office calls back on all labs and imaging within 1 week of receiving the results. Patient instructed to reach out to our office if they have not heard from us so that we can request the results from the lab/imaging center           Araceli Johnston PA-C

## 2023-02-06 ENCOUNTER — PATIENT MESSAGE (OUTPATIENT)
Dept: FAMILY MEDICINE | Facility: CLINIC | Age: 68
End: 2023-02-06
Payer: MEDICARE

## 2023-02-07 ENCOUNTER — CLINICAL SUPPORT (OUTPATIENT)
Dept: FAMILY MEDICINE | Facility: CLINIC | Age: 68
End: 2023-02-07
Payer: MEDICARE

## 2023-02-07 ENCOUNTER — TELEPHONE (OUTPATIENT)
Dept: FAMILY MEDICINE | Facility: CLINIC | Age: 68
End: 2023-02-07

## 2023-02-07 DIAGNOSIS — Z01.89 VISIT FOR LABORATORY TEST: Primary | ICD-10-CM

## 2023-02-07 DIAGNOSIS — Z79.4 TYPE 2 DIABETES MELLITUS WITH STAGE 3 CHRONIC KIDNEY DISEASE, WITH LONG-TERM CURRENT USE OF INSULIN, UNSPECIFIED WHETHER STAGE 3A OR 3B CKD: ICD-10-CM

## 2023-02-07 DIAGNOSIS — E11.22 TYPE 2 DIABETES MELLITUS WITH STAGE 3 CHRONIC KIDNEY DISEASE, WITH LONG-TERM CURRENT USE OF INSULIN, UNSPECIFIED WHETHER STAGE 3A OR 3B CKD: ICD-10-CM

## 2023-02-07 DIAGNOSIS — N18.30 TYPE 2 DIABETES MELLITUS WITH STAGE 3 CHRONIC KIDNEY DISEASE, WITH LONG-TERM CURRENT USE OF INSULIN, UNSPECIFIED WHETHER STAGE 3A OR 3B CKD: ICD-10-CM

## 2023-02-07 PROCEDURE — 90694 VACC AIIV4 NO PRSRV 0.5ML IM: CPT | Mod: S$GLB,,, | Performed by: FAMILY MEDICINE

## 2023-02-07 PROCEDURE — G0008 ADMIN INFLUENZA VIRUS VAC: HCPCS | Mod: S$GLB,,, | Performed by: FAMILY MEDICINE

## 2023-02-07 RX ORDER — FLASH GLUCOSE SENSOR
KIT MISCELLANEOUS
Qty: 6 KIT | Refills: 3 | Status: SHIPPED | OUTPATIENT
Start: 2023-02-07 | End: 2023-02-10

## 2023-02-10 ENCOUNTER — PATIENT MESSAGE (OUTPATIENT)
Dept: FAMILY MEDICINE | Facility: CLINIC | Age: 68
End: 2023-02-10
Payer: MEDICARE

## 2023-02-10 DIAGNOSIS — Z79.4 TYPE 2 DIABETES MELLITUS WITHOUT COMPLICATION, WITH LONG-TERM CURRENT USE OF INSULIN: ICD-10-CM

## 2023-02-10 DIAGNOSIS — N18.30 TYPE 2 DIABETES MELLITUS WITH STAGE 3 CHRONIC KIDNEY DISEASE, WITH LONG-TERM CURRENT USE OF INSULIN, UNSPECIFIED WHETHER STAGE 3A OR 3B CKD: Primary | ICD-10-CM

## 2023-02-10 DIAGNOSIS — E11.22 TYPE 2 DIABETES MELLITUS WITH STAGE 3 CHRONIC KIDNEY DISEASE, WITH LONG-TERM CURRENT USE OF INSULIN, UNSPECIFIED WHETHER STAGE 3A OR 3B CKD: Primary | ICD-10-CM

## 2023-02-10 DIAGNOSIS — Z79.4 TYPE 2 DIABETES MELLITUS WITH STAGE 3 CHRONIC KIDNEY DISEASE, WITH LONG-TERM CURRENT USE OF INSULIN, UNSPECIFIED WHETHER STAGE 3A OR 3B CKD: Primary | ICD-10-CM

## 2023-02-10 DIAGNOSIS — E11.9 TYPE 2 DIABETES MELLITUS WITHOUT COMPLICATION, WITH LONG-TERM CURRENT USE OF INSULIN: ICD-10-CM

## 2023-02-10 RX ORDER — FLASH GLUCOSE SENSOR
2 KIT MISCELLANEOUS
Qty: 2 KIT | Refills: 5 | Status: SHIPPED | OUTPATIENT
Start: 2023-02-10

## 2023-02-15 RX ORDER — SEMAGLUTIDE 1.34 MG/ML
INJECTION, SOLUTION SUBCUTANEOUS
Refills: 0 | OUTPATIENT
Start: 2023-02-15

## 2023-02-16 NOTE — TELEPHONE ENCOUNTER
Attempted to contact patient, no answer. Left detailed message to please contact clinic if he did not receive his medications.

## 2023-02-27 ENCOUNTER — OFFICE VISIT (OUTPATIENT)
Dept: FAMILY MEDICINE | Facility: CLINIC | Age: 68
End: 2023-02-27
Payer: COMMERCIAL

## 2023-02-27 VITALS
WEIGHT: 264.38 LBS | SYSTOLIC BLOOD PRESSURE: 133 MMHG | HEIGHT: 64 IN | OXYGEN SATURATION: 99 % | HEART RATE: 81 BPM | BODY MASS INDEX: 45.14 KG/M2 | DIASTOLIC BLOOD PRESSURE: 60 MMHG

## 2023-02-27 DIAGNOSIS — G62.9 POLYNEUROPATHY: ICD-10-CM

## 2023-02-27 DIAGNOSIS — Z79.4 TYPE 2 DIABETES MELLITUS WITH STAGE 3 CHRONIC KIDNEY DISEASE, WITH LONG-TERM CURRENT USE OF INSULIN, UNSPECIFIED WHETHER STAGE 3A OR 3B CKD: ICD-10-CM

## 2023-02-27 DIAGNOSIS — E11.9 TYPE 2 DIABETES MELLITUS WITHOUT COMPLICATION, WITH LONG-TERM CURRENT USE OF INSULIN: Primary | ICD-10-CM

## 2023-02-27 DIAGNOSIS — K21.9 GASTROESOPHAGEAL REFLUX DISEASE, UNSPECIFIED WHETHER ESOPHAGITIS PRESENT: ICD-10-CM

## 2023-02-27 DIAGNOSIS — F41.1 GENERALIZED ANXIETY DISORDER: ICD-10-CM

## 2023-02-27 DIAGNOSIS — E78.5 HYPERLIPIDEMIA, UNSPECIFIED HYPERLIPIDEMIA TYPE: ICD-10-CM

## 2023-02-27 DIAGNOSIS — D64.9 CHRONIC ANEMIA: ICD-10-CM

## 2023-02-27 DIAGNOSIS — N18.30 TYPE 2 DIABETES MELLITUS WITH STAGE 3 CHRONIC KIDNEY DISEASE, WITH LONG-TERM CURRENT USE OF INSULIN, UNSPECIFIED WHETHER STAGE 3A OR 3B CKD: ICD-10-CM

## 2023-02-27 DIAGNOSIS — Z79.4 TYPE 2 DIABETES MELLITUS WITHOUT COMPLICATION, WITH LONG-TERM CURRENT USE OF INSULIN: Primary | ICD-10-CM

## 2023-02-27 DIAGNOSIS — E11.22 TYPE 2 DIABETES MELLITUS WITH STAGE 3 CHRONIC KIDNEY DISEASE, WITH LONG-TERM CURRENT USE OF INSULIN, UNSPECIFIED WHETHER STAGE 3A OR 3B CKD: ICD-10-CM

## 2023-02-27 DIAGNOSIS — E61.1 IRON DEFICIENCY: ICD-10-CM

## 2023-02-27 DIAGNOSIS — I10 PRIMARY HYPERTENSION: ICD-10-CM

## 2023-02-27 DIAGNOSIS — I25.10 CORONARY ARTERY DISEASE, UNSPECIFIED VESSEL OR LESION TYPE, UNSPECIFIED WHETHER ANGINA PRESENT, UNSPECIFIED WHETHER NATIVE OR TRANSPLANTED HEART: ICD-10-CM

## 2023-02-27 PROCEDURE — 99215 OFFICE O/P EST HI 40 MIN: CPT | Mod: S$GLB,,, | Performed by: PHYSICIAN ASSISTANT

## 2023-02-27 PROCEDURE — 99215 PR OFFICE/OUTPT VISIT, EST, LEVL V, 40-54 MIN: ICD-10-PCS | Mod: S$GLB,,, | Performed by: PHYSICIAN ASSISTANT

## 2023-02-27 RX ORDER — INSULIN GLARGINE 100 [IU]/ML
25 INJECTION, SOLUTION SUBCUTANEOUS DAILY
Qty: 10 ML | Refills: 5 | Status: SHIPPED | OUTPATIENT
Start: 2023-02-27 | End: 2023-09-05 | Stop reason: SDUPTHER

## 2023-02-27 RX ORDER — GLIMEPIRIDE 4 MG/1
4 TABLET ORAL 2 TIMES DAILY
Qty: 180 TABLET | Refills: 2 | Status: SHIPPED | OUTPATIENT
Start: 2023-02-27 | End: 2023-09-05 | Stop reason: SDUPTHER

## 2023-02-27 RX ORDER — CARVEDILOL 25 MG/1
25 TABLET ORAL 2 TIMES DAILY
Qty: 180 TABLET | Refills: 2 | Status: SHIPPED | OUTPATIENT
Start: 2023-02-27 | End: 2023-03-15

## 2023-02-27 RX ORDER — METFORMIN HYDROCHLORIDE 1000 MG/1
1000 TABLET ORAL 2 TIMES DAILY WITH MEALS
Qty: 180 TABLET | Refills: 3 | Status: SHIPPED | OUTPATIENT
Start: 2023-02-27 | End: 2023-09-05 | Stop reason: SDUPTHER

## 2023-02-27 RX ORDER — OLMESARTAN MEDOXOMIL 20 MG/1
20 TABLET ORAL DAILY
Qty: 90 TABLET | Refills: 1 | Status: SHIPPED | OUTPATIENT
Start: 2023-02-27 | End: 2023-09-05 | Stop reason: SDUPTHER

## 2023-02-27 RX ORDER — DAPAGLIFLOZIN 5 MG/1
5 TABLET, FILM COATED ORAL DAILY
Qty: 90 TABLET | Refills: 3 | Status: SHIPPED | OUTPATIENT
Start: 2023-02-27 | End: 2023-09-05 | Stop reason: SDUPTHER

## 2023-02-27 RX ORDER — ATORVASTATIN CALCIUM 40 MG/1
40 TABLET, FILM COATED ORAL NIGHTLY
Qty: 90 TABLET | Refills: 3 | Status: SHIPPED | OUTPATIENT
Start: 2023-02-27 | End: 2023-09-05 | Stop reason: SDUPTHER

## 2023-02-27 RX ORDER — PANTOPRAZOLE SODIUM 40 MG/1
40 TABLET, DELAYED RELEASE ORAL DAILY
Qty: 90 TABLET | Refills: 3 | Status: SHIPPED | OUTPATIENT
Start: 2023-02-27 | End: 2023-09-05 | Stop reason: SDUPTHER

## 2023-02-27 RX ORDER — DULOXETIN HYDROCHLORIDE 30 MG/1
CAPSULE, DELAYED RELEASE ORAL
Qty: 270 CAPSULE | Refills: 2 | Status: SHIPPED | OUTPATIENT
Start: 2023-02-27 | End: 2023-09-05 | Stop reason: SDUPTHER

## 2023-02-27 RX ORDER — VITAMIN E 268 MG
1 CAPSULE ORAL DAILY
Qty: 90 EACH | Refills: 2 | Status: SHIPPED | OUTPATIENT
Start: 2023-02-27 | End: 2023-05-28

## 2023-02-27 RX ORDER — GABAPENTIN 300 MG/1
600 CAPSULE ORAL 3 TIMES DAILY
Qty: 540 CAPSULE | Refills: 3 | Status: SHIPPED | OUTPATIENT
Start: 2023-02-27 | End: 2023-09-05 | Stop reason: SDUPTHER

## 2023-02-27 RX ORDER — FUROSEMIDE 40 MG/1
40 TABLET ORAL DAILY
Qty: 90 TABLET | Refills: 1 | Status: SHIPPED | OUTPATIENT
Start: 2023-02-27 | End: 2023-09-05 | Stop reason: SDUPTHER

## 2023-02-27 RX ORDER — SEMAGLUTIDE 1.34 MG/ML
1 INJECTION, SOLUTION SUBCUTANEOUS
Qty: 1 PEN | Refills: 11 | Status: SHIPPED | OUTPATIENT
Start: 2023-02-27 | End: 2023-07-17

## 2023-02-27 NOTE — PROGRESS NOTES
Subjective:      Patient ID: Jim Ryan is a 67 y.o. male.    Chief Complaint: Follow-up (Patient is here for a 1 month follow up visit )      HPI  Patient is here today for follow-up on chronic conditions      Type 2 diabetes-   On ozempic for 6 weeks   Pt has decreased lantus, due to having lows.   Stopped regular insulin completely.     Current regimen:  Farxiga 5mg daily  Amaryl 4mg BID  Metformin 1000 BID  Lantus 25 U at night,   Ozempic 0.5 mg weekly(titrating over time , goal to stop lantus)       A1c 8.3  Triglycerides 211    B12 deficiency- start b12 at home,     JAROD- started on PO iron and vit C at home x 1 mo. Recheck today      Anemia-patient with anemia which appears to be chronic microcytic in nature.  Likely a mixture of iron deficiency and chronic disease     Creatinine -1.24, GFR at 64 1 mo ago.  Will recheck with patient hydrated today    Gerd - well controlled on protonix      htn - well controlled on current regimen      Hld/cad/pad -on statin     CABG, 4 vessel- 2017, Dr. Malik did procedure.  Currently sees Dr. Paz.     Mobility, AVN - Uses wheelchair for mobility due to BL AVN  2005.  Had multiped failed attempts for replacement due to infections.      Gastric bypass - 2000, had updated weight loss surg labs 1/2023    New:  Pt reports chronic migraines for year.  States he wakes up with a headache nearly every day.  He has failed migraine therapies in the past : immitrex, maxalt, fioriciet, propranolol, and topamax      Review of Systems   Constitutional:  Negative for activity change, appetite change, chills, fatigue and fever.   HENT:  Negative for nasal congestion, facial swelling and rhinorrhea.    Eyes:  Negative for pain and visual disturbance.   Respiratory:  Negative for cough, chest tightness and shortness of breath.    Cardiovascular:  Negative for chest pain, palpitations, leg swelling and claudication.   Gastrointestinal:  Negative for abdominal distention, abdominal  "pain, anal bleeding, blood in stool, change in bowel habit, constipation, diarrhea and change in bowel habit.   Genitourinary:  Negative for difficulty urinating, flank pain and frequency.   Musculoskeletal:  Positive for arthralgias.   Integumentary:  Negative for rash.   Neurological:  Positive for headaches. Negative for dizziness, weakness and numbness.   Psychiatric/Behavioral:  Negative for self-injury, sleep disturbance and suicidal ideas. The patient is not nervous/anxious.      Medication List with Changes/Refills   New Medications    SEMAGLUTIDE (OZEMPIC) 1 MG/DOSE (4 MG/3 ML)    Inject 1 mg into the skin every 7 days.   Current Medications    ASPIRIN 81 MG CHEW    Take 81 mg by mouth once daily.    FLASH GLUCOSE SENSOR (Treemo LabsSTYLE ALESIA 2 SENSOR) KIT    2 each by Misc.(Non-Drug; Combo Route) route every 14 (fourteen) days.    MISCELLANEOUS MEDICAL SUPPLY KIT    [employer] motorized scooter    NITROGLYCERIN (NITROSTAT) 0.4 MG SL TABLET    Place 1 tablet (0.4 mg total) under the tongue every 5 (five) minutes as needed for Chest pain.    PEN NEEDLE, DIABETIC (BD ULTRA-FINE SHORT PEN NEEDLE) 31 GAUGE X 5/16" NDLE    Use as directed   Changed and/or Refilled Medications    Modified Medication Previous Medication    ASCORBIC ACID, VITAMIN C, (VITAMIN C) 500 MG TBSR ascorbic acid, vitamin C, (VITAMIN C) 500 mg TbSR       Take 1 tablet by mouth once daily.    Take 1 tablet by mouth once daily.    ATORVASTATIN (LIPITOR) 40 MG TABLET atorvastatin (LIPITOR) 40 MG tablet       Take 1 tablet (40 mg total) by mouth every evening.    Take 1 tablet (40 mg total) by mouth every evening.    CARVEDILOL (COREG) 25 MG TABLET carvediloL (COREG) 25 MG tablet       Take 1 tablet (25 mg total) by mouth 2 (two) times daily.    Take 1 tablet (25 mg total) by mouth 2 (two) times daily.    DAPAGLIFLOZIN (FARXIGA) 5 MG TAB TABLET dapagliflozin (FARXIGA) 5 mg Tab tablet       Take 1 tablet (5 mg total) by mouth once daily.    Take 1 " tablet (5 mg total) by mouth once daily.    DULOXETINE (CYMBALTA) 30 MG CAPSULE DULoxetine (CYMBALTA) 30 MG capsule       Take 1 tablet PO in am and 2 tablets PO in PM    Take 1 tablet PO in am and 2 tablets PO in PM    FERROUS SULFATE, DRIED (SLOW FE) 160 MG (50 MG IRON) TBSR ferrous sulfate, dried (SLOW FE) 160 mg (50 mg iron) TbSR       Take 1 tablet (160 mg total) by mouth once daily.    Take 1 tablet (160 mg total) by mouth once daily.    FUROSEMIDE (LASIX) 40 MG TABLET furosemide (LASIX) 40 MG tablet       Take 1 tablet (40 mg total) by mouth once daily.    Take 1 tablet (40 mg total) by mouth once daily.    GABAPENTIN (NEURONTIN) 300 MG CAPSULE gabapentin (NEURONTIN) 300 MG capsule       Take 2 capsules (600 mg total) by mouth 3 (three) times daily. 2 caps tID daily    Take 2 capsules (600 mg total) by mouth 3 (three) times daily. 2 caps tID daily    GLIMEPIRIDE (AMARYL) 4 MG TABLET glimepiride (AMARYL) 4 MG tablet       Take 1 tablet (4 mg total) by mouth 2 (two) times daily.    Take 1 tablet (4 mg total) by mouth 2 (two) times daily.    INSULIN (LANTUS SOLOSTAR U-100 INSULIN) GLARGINE 100 UNITS/ML SUBQ PEN insulin (LANTUS SOLOSTAR U-100 INSULIN) glargine 100 units/mL (3mL) SubQ pen       Inject 25 Units into the skin once daily.    Inject 40 Units into the skin once daily.    METFORMIN (GLUCOPHAGE) 1000 MG TABLET metFORMIN (GLUCOPHAGE) 1000 MG tablet       Take 1 tablet (1,000 mg total) by mouth 2 (two) times daily with meals.    Take 1 tablet (1,000 mg total) by mouth 2 (two) times daily with meals.    OLMESARTAN (BENICAR) 20 MG TABLET olmesartan (BENICAR) 20 MG tablet       Take 1 tablet (20 mg total) by mouth once daily.    Take 1 tablet (20 mg total) by mouth once daily.    PANTOPRAZOLE (PROTONIX) 40 MG TABLET pantoprazole (PROTONIX) 40 MG tablet       Take 1 tablet (40 mg total) by mouth once daily.    Take 1 tablet (40 mg total) by mouth once daily.   Discontinued Medications    HUMALOG KWIKPEN  "INSULIN 100 UNIT/ML PEN    SSI with meals, max dose 20 units    SEMAGLUTIDE (OZEMPIC) 0.25 MG OR 0.5 MG(2 MG/1.5 ML) PEN INJECTOR    Inject 0.5 mg into the skin every 7 days. for 2 weeks. Then increase to .5mg every 7 days        Objective:     Vitals:    02/27/23 0916   BP: 133/60   Pulse: 81   SpO2: 99%   Weight: 119.9 kg (264 lb 6.4 oz)   Height: 5' 4" (1.626 m)        Physical Exam  Constitutional:       Appearance: Normal appearance. He is obese.   HENT:      Head: Normocephalic and atraumatic.   Eyes:      Extraocular Movements: Extraocular movements intact.      Pupils: Pupils are equal, round, and reactive to light.   Cardiovascular:      Rate and Rhythm: Normal rate and regular rhythm.      Pulses: Normal pulses.      Heart sounds: Normal heart sounds.   Pulmonary:      Effort: Pulmonary effort is normal.      Breath sounds: Normal breath sounds.   Musculoskeletal:      Comments: Pt in wheel chair   Skin:     General: Skin is warm and dry.      Capillary Refill: Capillary refill takes less than 2 seconds.   Neurological:      Mental Status: He is alert and oriented to person, place, and time.   Psychiatric:         Mood and Affect: Mood normal.         Behavior: Behavior normal.         Thought Content: Thought content normal.          Assessment & Plan:     Type 2 diabetes mellitus without complication, with long-term current use of insulin  -     semaglutide (OZEMPIC) 1 mg/dose (4 mg/3 mL); Inject 1 mg into the skin every 7 days.  Dispense: 1 pen; Refill: 11  -     Comprehensive Metabolic Panel; Future; Expected date: 02/27/2023    Iron deficiency  -     Iron, TIBC and Ferritin Panel; Future; Expected date: 02/27/2023  -     ferrous sulfate, dried (SLOW FE) 160 mg (50 mg iron) TbSR; Take 1 tablet (160 mg total) by mouth once daily.  Dispense: 90 tablet; Refill: 1  -     ascorbic acid, vitamin C, (VITAMIN C) 500 mg TbSR; Take 1 tablet by mouth once daily.  Dispense: 90 each; Refill: 2    Primary " hypertension  -     olmesartan (BENICAR) 20 MG tablet; Take 1 tablet (20 mg total) by mouth once daily.  Dispense: 90 tablet; Refill: 1  -     carvediloL (COREG) 25 MG tablet; Take 1 tablet (25 mg total) by mouth 2 (two) times daily.  Dispense: 180 tablet; Refill: 2    Gastroesophageal reflux disease, unspecified whether esophagitis present  -     pantoprazole (PROTONIX) 40 MG tablet; Take 1 tablet (40 mg total) by mouth once daily.  Dispense: 90 tablet; Refill: 3    Type 2 diabetes mellitus with stage 3 chronic kidney disease, with long-term current use of insulin, unspecified whether stage 3a or 3b CKD  Comments:  will get all records, a1c 7. trying to get 2 week monitoring system for improved control  Orders:  -     metFORMIN (GLUCOPHAGE) 1000 MG tablet; Take 1 tablet (1,000 mg total) by mouth 2 (two) times daily with meals.  Dispense: 180 tablet; Refill: 3  -     insulin (LANTUS SOLOSTAR U-100 INSULIN) glargine 100 units/mL SubQ pen; Inject 25 Units into the skin once daily.  Dispense: 10 mL; Refill: 5  -     glimepiride (AMARYL) 4 MG tablet; Take 1 tablet (4 mg total) by mouth 2 (two) times daily.  Dispense: 180 tablet; Refill: 2  -     dapagliflozin (FARXIGA) 5 mg Tab tablet; Take 1 tablet (5 mg total) by mouth once daily.  Dispense: 90 tablet; Refill: 3    Polyneuropathy  -     gabapentin (NEURONTIN) 300 MG capsule; Take 2 capsules (600 mg total) by mouth 3 (three) times daily. 2 caps tID daily  Dispense: 540 capsule; Refill: 3    Coronary artery disease, unspecified vessel or lesion type, unspecified whether angina present, unspecified whether native or transplanted heart  -     furosemide (LASIX) 40 MG tablet; Take 1 tablet (40 mg total) by mouth once daily.  Dispense: 90 tablet; Refill: 1  -     carvediloL (COREG) 25 MG tablet; Take 1 tablet (25 mg total) by mouth 2 (two) times daily.  Dispense: 180 tablet; Refill: 2  -     atorvastatin (LIPITOR) 40 MG tablet; Take 1 tablet (40 mg total) by mouth every  evening.  Dispense: 90 tablet; Refill: 3    Chronic anemia  -     ferrous sulfate, dried (SLOW FE) 160 mg (50 mg iron) TbSR; Take 1 tablet (160 mg total) by mouth once daily.  Dispense: 90 tablet; Refill: 1  -     ascorbic acid, vitamin C, (VITAMIN C) 500 mg TbSR; Take 1 tablet by mouth once daily.  Dispense: 90 each; Refill: 2    Generalized anxiety disorder  -     DULoxetine (CYMBALTA) 30 MG capsule; Take 1 tablet PO in am and 2 tablets PO in PM  Dispense: 270 capsule; Refill: 2    Hyperlipidemia, unspecified hyperlipidemia type  -     atorvastatin (LIPITOR) 40 MG tablet; Take 1 tablet (40 mg total) by mouth every evening.  Dispense: 90 tablet; Refill: 3       1 mo followup   Samples: 1 box emgality, 1 box nurtec, 2 boxes ubrelvy    -Patient instructed that our office calls back on all labs and imaging within 1 week of receiving the results. Patient instructed to reach out to our office if they have not heard from us so that we can request the results from the lab/imaging center           Araceli Johnston PA-C

## 2023-03-08 ENCOUNTER — OFFICE VISIT (OUTPATIENT)
Dept: FAMILY MEDICINE | Facility: CLINIC | Age: 68
End: 2023-03-08
Payer: COMMERCIAL

## 2023-03-08 VITALS
SYSTOLIC BLOOD PRESSURE: 122 MMHG | WEIGHT: 264 LBS | BODY MASS INDEX: 45.07 KG/M2 | HEART RATE: 78 BPM | RESPIRATION RATE: 14 BRPM | OXYGEN SATURATION: 99 % | HEIGHT: 64 IN | DIASTOLIC BLOOD PRESSURE: 74 MMHG

## 2023-03-08 DIAGNOSIS — L02.415 ABSCESS OF RIGHT LEG: Primary | ICD-10-CM

## 2023-03-08 PROCEDURE — 99213 OFFICE O/P EST LOW 20 MIN: CPT | Mod: 25,S$GLB,, | Performed by: PHYSICIAN ASSISTANT

## 2023-03-08 PROCEDURE — 99213 PR OFFICE/OUTPT VISIT, EST, LEVL III, 20-29 MIN: ICD-10-PCS | Mod: 25,S$GLB,, | Performed by: PHYSICIAN ASSISTANT

## 2023-03-08 PROCEDURE — 96372 PR INJECTION,THERAP/PROPH/DIAG2ST, IM OR SUBCUT: ICD-10-PCS | Mod: S$GLB,,, | Performed by: PHYSICIAN ASSISTANT

## 2023-03-08 PROCEDURE — 96372 THER/PROPH/DIAG INJ SC/IM: CPT | Mod: S$GLB,,, | Performed by: PHYSICIAN ASSISTANT

## 2023-03-08 RX ORDER — CLINDAMYCIN HYDROCHLORIDE 300 MG/1
300 CAPSULE ORAL 3 TIMES DAILY
Qty: 30 CAPSULE | Refills: 0 | Status: SHIPPED | OUTPATIENT
Start: 2023-03-08 | End: 2023-03-13 | Stop reason: SDUPTHER

## 2023-03-08 RX ORDER — CEFTRIAXONE 1 G/1
1 INJECTION, POWDER, FOR SOLUTION INTRAMUSCULAR; INTRAVENOUS
Status: DISCONTINUED | OUTPATIENT
Start: 2023-03-08 | End: 2023-03-08

## 2023-03-08 RX ORDER — CEFTRIAXONE 1 G/1
1 INJECTION, POWDER, FOR SOLUTION INTRAMUSCULAR; INTRAVENOUS
Status: COMPLETED | OUTPATIENT
Start: 2023-03-08 | End: 2023-03-08

## 2023-03-08 RX ORDER — DOXYCYCLINE 100 MG/1
100 CAPSULE ORAL 2 TIMES DAILY
Qty: 20 CAPSULE | Refills: 0 | Status: SHIPPED | OUTPATIENT
Start: 2023-03-08 | End: 2023-03-08

## 2023-03-08 RX ORDER — LIDOCAINE HYDROCHLORIDE 10 MG/ML
2.1 INJECTION INFILTRATION; PERINEURAL ONCE
Status: COMPLETED | OUTPATIENT
Start: 2023-03-08 | End: 2023-03-08

## 2023-03-08 RX ADMIN — CEFTRIAXONE 1 G: 1 INJECTION, POWDER, FOR SOLUTION INTRAMUSCULAR; INTRAVENOUS at 10:03

## 2023-03-08 RX ADMIN — LIDOCAINE HYDROCHLORIDE 2.1 ML: 10 INJECTION INFILTRATION; PERINEURAL at 10:03

## 2023-03-08 NOTE — PROGRESS NOTES
Subjective:      Patient ID: Jim Ryan is a 67 y.o. male.    Chief Complaint: Follow-up (Right shin wound)      HPI  Patient is here today with a chief complaint of draining wound to right anterior lower extremity.  Patient reports the area was draining a clear serous substance 3 days ago.  Increased to purulence drainage in the past 12 hours.  Patient reports overall he is feeling well.  He denies any fever, chills, nausea, vomiting.    Review of Systems   Constitutional:  Negative for activity change, appetite change, chills, diaphoresis, fatigue, fever and unexpected weight change.   Respiratory:  Negative for cough and shortness of breath.    Cardiovascular:  Negative for chest pain.   Gastrointestinal:  Negative for nausea and vomiting.   Musculoskeletal:  Positive for leg pain.     Medication List with Changes/Refills   New Medications    CLINDAMYCIN (CLEOCIN) 300 MG CAPSULE    Take 1 capsule (300 mg total) by mouth 3 (three) times daily. for 10 days   Current Medications    ASCORBIC ACID, VITAMIN C, (VITAMIN C) 500 MG TBSR    Take 1 tablet by mouth once daily.    ASPIRIN 81 MG CHEW    Take 81 mg by mouth once daily.    ATORVASTATIN (LIPITOR) 40 MG TABLET    Take 1 tablet (40 mg total) by mouth every evening.    CARVEDILOL (COREG) 25 MG TABLET    Take 1 tablet (25 mg total) by mouth 2 (two) times daily.    DAPAGLIFLOZIN (FARXIGA) 5 MG TAB TABLET    Take 1 tablet (5 mg total) by mouth once daily.    DULOXETINE (CYMBALTA) 30 MG CAPSULE    Take 1 tablet PO in am and 2 tablets PO in PM    FERROUS SULFATE, DRIED (SLOW FE) 160 MG (50 MG IRON) TBSR    Take 1 tablet (160 mg total) by mouth once daily.    FLASH GLUCOSE SENSOR (FREESTYLE ALESIA 2 SENSOR) KIT    2 each by Misc.(Non-Drug; Combo Route) route every 14 (fourteen) days.    FUROSEMIDE (LASIX) 40 MG TABLET    Take 1 tablet (40 mg total) by mouth once daily.    GABAPENTIN (NEURONTIN) 300 MG CAPSULE    Take 2 capsules (600 mg total) by mouth 3 (three) times  "daily. 2 caps tID daily    GLIMEPIRIDE (AMARYL) 4 MG TABLET    Take 1 tablet (4 mg total) by mouth 2 (two) times daily.    INSULIN (LANTUS SOLOSTAR U-100 INSULIN) GLARGINE 100 UNITS/ML SUBQ PEN    Inject 25 Units into the skin once daily.    METFORMIN (GLUCOPHAGE) 1000 MG TABLET    Take 1 tablet (1,000 mg total) by mouth 2 (two) times daily with meals.    MISCELLANEOUS MEDICAL SUPPLY KIT    [facility]    NITROGLYCERIN (NITROSTAT) 0.4 MG SL TABLET    Place 1 tablet (0.4 mg total) under the tongue every 5 (five) minutes as needed for Chest pain.    OLMESARTAN (BENICAR) 20 MG TABLET    Take 1 tablet (20 mg total) by mouth once daily.    PANTOPRAZOLE (PROTONIX) 40 MG TABLET    Take 1 tablet (40 mg total) by mouth once daily.    PEN NEEDLE, DIABETIC (BD ULTRA-FINE SHORT PEN NEEDLE) 31 GAUGE X 5/16" NDLE    Use as directed    SEMAGLUTIDE (OZEMPIC) 1 MG/DOSE (4 MG/3 ML)    Inject 1 mg into the skin every 7 days.        Objective:     Vitals:    03/08/23 0913   BP: 122/74   Pulse: 78   Resp: 14   SpO2: 99%   Weight: 119.7 kg (264 lb)   Height: 5' 4" (1.626 m)        Physical Exam  Constitutional:       Appearance: Normal appearance. He is obese.   HENT:      Head: Normocephalic and atraumatic.   Skin:     Comments:   Right lower extremity-  Abscess of her anterior tib fib, already draining.  I was able to produce some exudate with manual compression.  Culture was obtained.  There is some mild surrounding redness, no streaking.   Neurological:      Mental Status: He is alert.          Assessment & Plan:     Abscess of right leg  -     Discontinue: doxycycline (MONODOX) 100 MG capsule; Take 1 capsule (100 mg total) by mouth 2 (two) times daily. for 10 days  Dispense: 20 capsule; Refill: 0  -     Aerobic culture  -     clindamycin (CLEOCIN) 300 MG capsule; Take 1 capsule (300 mg total) by mouth 3 (three) times daily. for 10 days  Dispense: 30 capsule; Refill: 0  -     Discontinue: cefTRIAXone injection 1 g  -   "   cefTRIAXone injection 1 g  -     LIDOcaine HCL 10 mg/ml (1%) injection 2.1 mL       Patient had daily follow-up over the next 48 hours, he was given precautions for fever, chills, nausea, vomiting, or feeling ill.      -Patient instructed that our office calls back on all labs and imaging within 1 week of receiving the results. Patient instructed to reach out to our office if they have not heard from us so that we can request the results from the lab/imaging center           Araceli Johnston PA-C

## 2023-03-09 ENCOUNTER — OFFICE VISIT (OUTPATIENT)
Dept: FAMILY MEDICINE | Facility: CLINIC | Age: 68
End: 2023-03-09
Payer: COMMERCIAL

## 2023-03-09 VITALS
SYSTOLIC BLOOD PRESSURE: 122 MMHG | HEIGHT: 64 IN | BODY MASS INDEX: 43.36 KG/M2 | OXYGEN SATURATION: 99 % | HEART RATE: 77 BPM | DIASTOLIC BLOOD PRESSURE: 78 MMHG | RESPIRATION RATE: 16 BRPM | WEIGHT: 254 LBS

## 2023-03-09 DIAGNOSIS — L03.115 CELLULITIS OF RIGHT LOWER EXTREMITY: Primary | ICD-10-CM

## 2023-03-09 PROCEDURE — 99213 OFFICE O/P EST LOW 20 MIN: CPT | Mod: 25,S$GLB,, | Performed by: FAMILY MEDICINE

## 2023-03-09 PROCEDURE — 99213 PR OFFICE/OUTPT VISIT, EST, LEVL III, 20-29 MIN: ICD-10-PCS | Mod: 25,S$GLB,, | Performed by: FAMILY MEDICINE

## 2023-03-09 PROCEDURE — 96372 THER/PROPH/DIAG INJ SC/IM: CPT | Mod: S$GLB,,, | Performed by: FAMILY MEDICINE

## 2023-03-09 PROCEDURE — 96372 PR INJECTION,THERAP/PROPH/DIAG2ST, IM OR SUBCUT: ICD-10-PCS | Mod: S$GLB,,, | Performed by: FAMILY MEDICINE

## 2023-03-09 RX ORDER — LIDOCAINE HYDROCHLORIDE 10 MG/ML
2.1 INJECTION INFILTRATION; PERINEURAL ONCE
Status: COMPLETED | OUTPATIENT
Start: 2023-03-09 | End: 2023-03-09

## 2023-03-09 RX ORDER — CEFTRIAXONE 1 G/1
1 INJECTION, POWDER, FOR SOLUTION INTRAMUSCULAR; INTRAVENOUS
Status: COMPLETED | OUTPATIENT
Start: 2023-03-09 | End: 2023-03-09

## 2023-03-09 RX ADMIN — CEFTRIAXONE 1 G: 1 INJECTION, POWDER, FOR SOLUTION INTRAMUSCULAR; INTRAVENOUS at 09:03

## 2023-03-09 RX ADMIN — LIDOCAINE HYDROCHLORIDE 2.1 ML: 10 INJECTION INFILTRATION; PERINEURAL at 09:03

## 2023-03-09 NOTE — PROGRESS NOTES
Wound care performed to  right anterior lower extremity. Cleaned with NS. Mupirocin ointment applied around wound, dressed with non adherent pad, abd pad, and wrapped with Ace Bandage. Pt tolerated well. Will return to clinic tomorrow for wound care/evaluation.

## 2023-03-09 NOTE — PROGRESS NOTES
Subjective:       Patient ID: Jim Ryan is a 67 y.o. male.    Chief Complaint: Follow-up and Wound Care (Patient is here today with a chief complaint of draining wound to right anterior lower extremity)    HPI    F/u cellulitis RLE - improved today, erythema has regressed slightly and there is less drainage. Pt denies any fevers or weakness  They are dressing appropriately and keeping clean/dry    Review of Systems   Constitutional:  Negative for chills, diaphoresis, fatigue and fever.   Eyes:  Negative for visual disturbance.   Respiratory:  Negative for cough, chest tightness and shortness of breath.    Cardiovascular:  Negative for chest pain and palpitations.   Gastrointestinal:  Negative for abdominal pain, nausea and vomiting.   Musculoskeletal:  Negative for myalgias.   Integumentary:  Positive for color change and wound. Negative for pallor and rash.   Neurological:  Negative for dizziness, syncope, weakness, light-headedness and headaches.   Psychiatric/Behavioral:  Negative for decreased concentration, dysphoric mood and sleep disturbance. The patient is not nervous/anxious.        Objective:      Physical Exam  Constitutional:       General: He is not in acute distress.     Appearance: He is well-developed. He is not diaphoretic.   HENT:      Head: Normocephalic and atraumatic.      Nose: Nose normal.   Eyes:      Conjunctiva/sclera: Conjunctivae normal.   Neck:      Thyroid: No thyromegaly.   Cardiovascular:      Rate and Rhythm: Normal rate and regular rhythm.      Heart sounds: Normal heart sounds.   Pulmonary:      Effort: Pulmonary effort is normal. No respiratory distress.      Breath sounds: Normal breath sounds. No stridor. No wheezing or rales.   Musculoskeletal:      Cervical back: Normal range of motion and neck supple.      Comments: Erythema regressed vs yesterday's marking   Skin:     General: Skin is warm and dry.   Neurological:      Mental Status: He is alert and oriented to person,  place, and time.   Psychiatric:         Behavior: Behavior normal.       Assessment:       Problem List Items Addressed This Visit    None  Visit Diagnoses       Cellulitis of right lower extremity    -  Primary    Relevant Medications    cefTRIAXone injection 1 g (Completed)    LIDOcaine HCL 10 mg/ml (1%) injection 2.1 mL (Completed)    Other Relevant Orders    PT wound care              Plan:       Jim was seen today for follow-up and wound care.    Diagnoses and all orders for this visit:    Cellulitis of right lower extremity  -     cefTRIAXone injection 1 g  -     LIDOcaine HCL 10 mg/ml (1%) injection 2.1 mL  -     PT wound care; Future     Clinda q6h and rocephin im today

## 2023-03-10 ENCOUNTER — OFFICE VISIT (OUTPATIENT)
Dept: FAMILY MEDICINE | Facility: CLINIC | Age: 68
End: 2023-03-10
Payer: COMMERCIAL

## 2023-03-10 VITALS — SYSTOLIC BLOOD PRESSURE: 128 MMHG | HEART RATE: 73 BPM | DIASTOLIC BLOOD PRESSURE: 75 MMHG

## 2023-03-10 DIAGNOSIS — Z79.4 TYPE 2 DIABETES MELLITUS WITHOUT COMPLICATION, WITH LONG-TERM CURRENT USE OF INSULIN: Primary | ICD-10-CM

## 2023-03-10 DIAGNOSIS — L02.415 ABSCESS OF RIGHT LEG: ICD-10-CM

## 2023-03-10 DIAGNOSIS — E11.9 TYPE 2 DIABETES MELLITUS WITHOUT COMPLICATION, WITH LONG-TERM CURRENT USE OF INSULIN: Primary | ICD-10-CM

## 2023-03-10 DIAGNOSIS — L03.115 CELLULITIS OF RIGHT LOWER EXTREMITY: ICD-10-CM

## 2023-03-10 LAB — CULTURE: NORMAL

## 2023-03-10 PROCEDURE — 99212 OFFICE O/P EST SF 10 MIN: CPT | Mod: 25,S$GLB,, | Performed by: FAMILY MEDICINE

## 2023-03-10 PROCEDURE — 96372 PR INJECTION,THERAP/PROPH/DIAG2ST, IM OR SUBCUT: ICD-10-PCS | Mod: S$GLB,,, | Performed by: FAMILY MEDICINE

## 2023-03-10 PROCEDURE — 99212 PR OFFICE/OUTPT VISIT, EST, LEVL II, 10-19 MIN: ICD-10-PCS | Mod: 25,S$GLB,, | Performed by: FAMILY MEDICINE

## 2023-03-10 PROCEDURE — 96372 THER/PROPH/DIAG INJ SC/IM: CPT | Mod: S$GLB,,, | Performed by: FAMILY MEDICINE

## 2023-03-10 RX ORDER — CEFTRIAXONE 1 G/1
1 INJECTION, POWDER, FOR SOLUTION INTRAMUSCULAR; INTRAVENOUS
Status: COMPLETED | OUTPATIENT
Start: 2023-03-10 | End: 2023-03-10

## 2023-03-10 RX ORDER — LIDOCAINE HYDROCHLORIDE 10 MG/ML
2.1 INJECTION INFILTRATION; PERINEURAL ONCE
Status: COMPLETED | OUTPATIENT
Start: 2023-03-10 | End: 2023-03-10

## 2023-03-10 RX ADMIN — LIDOCAINE HYDROCHLORIDE 2.1 ML: 10 INJECTION INFILTRATION; PERINEURAL at 11:03

## 2023-03-10 RX ADMIN — CEFTRIAXONE 1 G: 1 INJECTION, POWDER, FOR SOLUTION INTRAMUSCULAR; INTRAVENOUS at 10:03

## 2023-03-10 NOTE — PROGRESS NOTES
Subjective:       Patient ID: Jim Ryan is a 67 y.o. male.    Chief Complaint: Follow-up (Patient is here for a 1 day follow up visit )    HPI    F/u cellulitis - improved today  No fever, weakness  Drainage minimal     Review of Systems   Constitutional:  Negative for chills, diaphoresis, fatigue and fever.   Eyes:  Negative for visual disturbance.   Respiratory:  Negative for cough, chest tightness and shortness of breath.    Cardiovascular:  Negative for chest pain and palpitations.   Gastrointestinal:  Negative for abdominal pain, nausea and vomiting.   Musculoskeletal:  Negative for myalgias.   Integumentary:  Positive for color change and wound. Negative for pallor and rash.   Neurological:  Negative for dizziness, syncope, weakness, light-headedness and headaches.   Psychiatric/Behavioral:  Negative for decreased concentration, dysphoric mood and sleep disturbance. The patient is not nervous/anxious.        Objective:      Physical Exam  Constitutional:       General: He is not in acute distress.     Appearance: He is well-developed. He is not diaphoretic.   HENT:      Head: Normocephalic and atraumatic.      Nose: Nose normal.   Eyes:      Conjunctiva/sclera: Conjunctivae normal.   Neck:      Thyroid: No thyromegaly.   Cardiovascular:      Rate and Rhythm: Normal rate and regular rhythm.      Heart sounds: Normal heart sounds.   Pulmonary:      Effort: Pulmonary effort is normal. No respiratory distress.      Breath sounds: Normal breath sounds. No stridor. No wheezing or rales.   Musculoskeletal:      Cervical back: Normal range of motion and neck supple.   Skin:     General: Skin is warm and dry.      Comments: Erythema improved relative to yesterday   Neurological:      Mental Status: He is alert and oriented to person, place, and time.   Psychiatric:         Behavior: Behavior normal.       Assessment:       Problem List Items Addressed This Visit          Endocrine    DM (diabetes mellitus), type  2 - Primary    Relevant Medications    cefTRIAXone injection 1 g (Completed)    LIDOcaine HCL 10 mg/ml (1%) injection 2.1 mL (Completed)     Other Visit Diagnoses       Abscess of right leg        Relevant Medications    cefTRIAXone injection 1 g (Completed)    LIDOcaine HCL 10 mg/ml (1%) injection 2.1 mL (Completed)    Cellulitis of right lower extremity        Relevant Medications    cefTRIAXone injection 1 g (Completed)    LIDOcaine HCL 10 mg/ml (1%) injection 2.1 mL (Completed)              Plan:       Type 2 diabetes mellitus without complication, with long-term current use of insulin  -     cefTRIAXone injection 1 g  -     LIDOcaine HCL 10 mg/ml (1%) injection 2.1 mL    Abscess of right leg  -     cefTRIAXone injection 1 g  -     LIDOcaine HCL 10 mg/ml (1%) injection 2.1 mL    Cellulitis of right lower extremity  -     cefTRIAXone injection 1 g  -     LIDOcaine HCL 10 mg/ml (1%) injection 2.1 mL

## 2023-03-13 ENCOUNTER — OFFICE VISIT (OUTPATIENT)
Dept: FAMILY MEDICINE | Facility: CLINIC | Age: 68
End: 2023-03-13
Payer: COMMERCIAL

## 2023-03-13 VITALS
BODY MASS INDEX: 43.36 KG/M2 | RESPIRATION RATE: 14 BRPM | SYSTOLIC BLOOD PRESSURE: 123 MMHG | WEIGHT: 254 LBS | DIASTOLIC BLOOD PRESSURE: 78 MMHG | HEART RATE: 87 BPM | OXYGEN SATURATION: 100 % | HEIGHT: 64 IN

## 2023-03-13 DIAGNOSIS — L03.90 CELLULITIS, UNSPECIFIED CELLULITIS SITE: ICD-10-CM

## 2023-03-13 PROCEDURE — 99213 OFFICE O/P EST LOW 20 MIN: CPT | Mod: S$GLB,,, | Performed by: FAMILY MEDICINE

## 2023-03-13 PROCEDURE — 99213 PR OFFICE/OUTPT VISIT, EST, LEVL III, 20-29 MIN: ICD-10-PCS | Mod: S$GLB,,, | Performed by: FAMILY MEDICINE

## 2023-03-13 RX ORDER — CLINDAMYCIN HYDROCHLORIDE 300 MG/1
300 CAPSULE ORAL 4 TIMES DAILY
Qty: 20 CAPSULE | Refills: 0 | Status: SHIPPED | OUTPATIENT
Start: 2023-03-13 | End: 2023-03-18

## 2023-03-13 NOTE — PROGRESS NOTES
Subjective:       Patient ID: Jim Ryan is a 67 y.o. male.    Chief Complaint: Follow-up (Patient is here today with a chief complaint of draining wound to right anterior lower extremity.)    HPI  F/u cellulitis - erythema regressing but still present, wound open with minimal drainage  No fever    Review of Systems   Constitutional:  Negative for chills, diaphoresis, fatigue and fever.   Eyes:  Negative for visual disturbance.   Respiratory:  Negative for cough, chest tightness and shortness of breath.    Cardiovascular:  Negative for chest pain and palpitations.   Gastrointestinal:  Negative for abdominal pain, nausea and vomiting.   Musculoskeletal:  Negative for myalgias.   Integumentary:  Positive for color change and wound. Negative for pallor and rash.   Neurological:  Negative for dizziness, syncope, weakness, light-headedness and headaches.   Psychiatric/Behavioral:  Negative for decreased concentration, dysphoric mood and sleep disturbance. The patient is not nervous/anxious.        Objective:      Physical Exam  Constitutional:       General: He is not in acute distress.     Appearance: He is well-developed. He is not diaphoretic.   HENT:      Head: Normocephalic and atraumatic.      Nose: Nose normal.   Eyes:      Conjunctiva/sclera: Conjunctivae normal.   Neck:      Thyroid: No thyromegaly.   Cardiovascular:      Rate and Rhythm: Normal rate and regular rhythm.      Heart sounds: Normal heart sounds.   Pulmonary:      Effort: Pulmonary effort is normal. No respiratory distress.      Breath sounds: Normal breath sounds. No stridor. No wheezing or rales.   Musculoskeletal:      Cervical back: Normal range of motion and neck supple.      Comments: Open wound with minimal yellow drainage, erythema regressed vs previous exam   Skin:     General: Skin is warm and dry.   Neurological:      Mental Status: He is alert and oriented to person, place, and time.   Psychiatric:         Behavior: Behavior normal.        Assessment:       Problem List Items Addressed This Visit    None  Visit Diagnoses       Cellulitis, unspecified cellulitis site        Relevant Medications    clindamycin (CLEOCIN) 300 MG capsule              Plan:       Jim was seen today for follow-up.    Diagnoses and all orders for this visit:    Cellulitis, unspecified cellulitis site  -     clindamycin (CLEOCIN) 300 MG capsule; Take 1 capsule (300 mg total) by mouth 4 (four) times daily. for 5 days

## 2023-03-13 NOTE — PROGRESS NOTES
Wound cleaned with NS. Mupirocin applied, dressed with non adherent dressing, abd pad, and ace wrap. Patient tolerated well.

## 2023-03-15 ENCOUNTER — OFFICE VISIT (OUTPATIENT)
Dept: FAMILY MEDICINE | Facility: CLINIC | Age: 68
End: 2023-03-15
Payer: COMMERCIAL

## 2023-03-15 VITALS
OXYGEN SATURATION: 100 % | WEIGHT: 254 LBS | SYSTOLIC BLOOD PRESSURE: 124 MMHG | HEIGHT: 64 IN | HEART RATE: 78 BPM | RESPIRATION RATE: 14 BRPM | BODY MASS INDEX: 43.36 KG/M2 | DIASTOLIC BLOOD PRESSURE: 84 MMHG

## 2023-03-15 DIAGNOSIS — L02.91 ABSCESS: Primary | ICD-10-CM

## 2023-03-15 PROCEDURE — 99213 PR OFFICE/OUTPT VISIT, EST, LEVL III, 20-29 MIN: ICD-10-PCS | Mod: S$GLB,,, | Performed by: FAMILY MEDICINE

## 2023-03-15 PROCEDURE — 99213 OFFICE O/P EST LOW 20 MIN: CPT | Mod: S$GLB,,, | Performed by: FAMILY MEDICINE

## 2023-03-15 RX ORDER — BUPRENORPHINE AND NALOXONE 8; 2 MG/1; MG/1
FILM, SOLUBLE BUCCAL; SUBLINGUAL
COMMUNITY
Start: 2023-03-14

## 2023-03-15 RX ORDER — TIZANIDINE 4 MG/1
TABLET ORAL
COMMUNITY
Start: 2023-03-14 | End: 2024-03-28

## 2023-03-15 NOTE — PROGRESS NOTES
Subjective:       Patient ID: Jim Ryan is a 67 y.o. male.    Chief Complaint: Follow-up and Wound Consult (Patient is here today with a chief complaint of draining wound to right anterior lower extremity. Erythema decreased, drainage seems to have increased. )    HPI    F/u cellulitis -   Erythema improved but wound has more purulent drainage and foul odor now. He has been on clinda q6h    Review of Systems   Constitutional:  Negative for chills, diaphoresis, fatigue and fever.   Eyes:  Negative for visual disturbance.   Respiratory:  Negative for cough, chest tightness and shortness of breath.    Cardiovascular:  Negative for chest pain and palpitations.   Gastrointestinal:  Negative for abdominal pain, nausea and vomiting.   Musculoskeletal:  Negative for myalgias.   Integumentary:  Positive for wound. Negative for pallor and rash.   Neurological:  Negative for dizziness, syncope, weakness, light-headedness and headaches.   Psychiatric/Behavioral:  Negative for decreased concentration, dysphoric mood and sleep disturbance. The patient is not nervous/anxious.        Objective:      Physical Exam  Constitutional:       General: He is not in acute distress.     Appearance: He is well-developed. He is not diaphoretic.   HENT:      Head: Normocephalic and atraumatic.      Nose: Nose normal.   Eyes:      Conjunctiva/sclera: Conjunctivae normal.   Neck:      Thyroid: No thyromegaly.   Cardiovascular:      Rate and Rhythm: Normal rate and regular rhythm.      Heart sounds: Normal heart sounds.   Pulmonary:      Effort: Pulmonary effort is normal. No respiratory distress.      Breath sounds: Normal breath sounds. No stridor. No wheezing or rales.   Musculoskeletal:      Cervical back: Normal range of motion and neck supple.   Skin:     General: Skin is warm and dry.      Comments: RLE wound with surrouding erythema and purulent discharge   Neurological:      Mental Status: He is alert and oriented to person, place,  and time.   Psychiatric:         Behavior: Behavior normal.       Assessment:       Problem List Items Addressed This Visit    None  Visit Diagnoses       Abscess    -  Primary    Relevant Orders    Ambulatory referral/consult to General Surgery              Plan:       Jim was seen today for follow-up and wound consult.    Diagnoses and all orders for this visit:    Abscess  -     Ambulatory referral/consult to General Surgery; Future    Rec admission for I&D and IV abx - discussed with [facility] and setting up with Dr. Douglass today  Said they will admit from clinic if necessary after eval

## 2023-03-23 ENCOUNTER — TELEPHONE (OUTPATIENT)
Dept: FAMILY MEDICINE | Facility: CLINIC | Age: 68
End: 2023-03-23
Payer: MEDICARE

## 2023-03-23 DIAGNOSIS — Z79.4 TYPE 2 DIABETES MELLITUS WITHOUT COMPLICATION, WITH LONG-TERM CURRENT USE OF INSULIN: ICD-10-CM

## 2023-03-23 DIAGNOSIS — E11.9 TYPE 2 DIABETES MELLITUS WITHOUT COMPLICATION, WITH LONG-TERM CURRENT USE OF INSULIN: ICD-10-CM

## 2023-03-23 DIAGNOSIS — L02.91 ABSCESS: Primary | ICD-10-CM

## 2023-03-23 DIAGNOSIS — L03.90 CELLULITIS, UNSPECIFIED CELLULITIS SITE: ICD-10-CM

## 2023-03-23 NOTE — TELEPHONE ENCOUNTER
----- Message from Sada Gallegos sent at 3/23/2023  3:07 PM CDT -----  Contact: Mattie/[facility]  Mattie needs a call back at [phone], regards to the home health referral she stated that she needs to clarify some information before they can proceed.      Thanks  Td

## 2023-03-24 ENCOUNTER — PATIENT MESSAGE (OUTPATIENT)
Dept: FAMILY MEDICINE | Facility: CLINIC | Age: 68
End: 2023-03-24
Payer: MEDICARE

## 2023-03-24 PROCEDURE — G0180 MD CERTIFICATION HHA PATIENT: HCPCS | Mod: ,,, | Performed by: FAMILY MEDICINE

## 2023-03-24 PROCEDURE — G0180 PR HOME HEALTH MD CERTIFICATION: ICD-10-PCS | Mod: ,,, | Performed by: FAMILY MEDICINE

## 2023-03-28 ENCOUNTER — OFFICE VISIT (OUTPATIENT)
Dept: FAMILY MEDICINE | Facility: CLINIC | Age: 68
End: 2023-03-28
Payer: COMMERCIAL

## 2023-03-28 VITALS
SYSTOLIC BLOOD PRESSURE: 126 MMHG | WEIGHT: 252.69 LBS | HEART RATE: 86 BPM | RESPIRATION RATE: 17 BRPM | OXYGEN SATURATION: 99 % | BODY MASS INDEX: 43.14 KG/M2 | DIASTOLIC BLOOD PRESSURE: 67 MMHG | HEIGHT: 64 IN

## 2023-03-28 DIAGNOSIS — Z79.4 TYPE 2 DIABETES MELLITUS WITHOUT COMPLICATION, WITH LONG-TERM CURRENT USE OF INSULIN: ICD-10-CM

## 2023-03-28 DIAGNOSIS — E11.9 TYPE 2 DIABETES MELLITUS WITHOUT COMPLICATION, WITH LONG-TERM CURRENT USE OF INSULIN: ICD-10-CM

## 2023-03-28 DIAGNOSIS — I44.1 MOBITZ I: ICD-10-CM

## 2023-03-28 DIAGNOSIS — N17.9 ACUTE KIDNEY INJURY: ICD-10-CM

## 2023-03-28 DIAGNOSIS — I10 PRIMARY HYPERTENSION: ICD-10-CM

## 2023-03-28 DIAGNOSIS — E53.8 B12 DEFICIENCY: Primary | ICD-10-CM

## 2023-03-28 DIAGNOSIS — I25.10 CORONARY ARTERY DISEASE, UNSPECIFIED VESSEL OR LESION TYPE, UNSPECIFIED WHETHER ANGINA PRESENT, UNSPECIFIED WHETHER NATIVE OR TRANSPLANTED HEART: ICD-10-CM

## 2023-03-28 DIAGNOSIS — E61.1 IRON DEFICIENCY: ICD-10-CM

## 2023-03-28 PROCEDURE — 99215 PR OFFICE/OUTPT VISIT, EST, LEVL V, 40-54 MIN: ICD-10-PCS | Mod: S$GLB,,, | Performed by: PHYSICIAN ASSISTANT

## 2023-03-28 PROCEDURE — 99215 OFFICE O/P EST HI 40 MIN: CPT | Mod: S$GLB,,, | Performed by: PHYSICIAN ASSISTANT

## 2023-04-19 ENCOUNTER — EXTERNAL HOME HEALTH (OUTPATIENT)
Dept: HOME HEALTH SERVICES | Facility: HOSPITAL | Age: 68
End: 2023-04-19
Payer: COMMERCIAL

## 2023-04-28 ENCOUNTER — PATIENT MESSAGE (OUTPATIENT)
Dept: FAMILY MEDICINE | Facility: CLINIC | Age: 68
End: 2023-04-28
Payer: MEDICARE

## 2023-05-01 DIAGNOSIS — G43.109 MIGRAINE WITH AURA AND WITHOUT STATUS MIGRAINOSUS, NOT INTRACTABLE: Primary | ICD-10-CM

## 2023-05-02 DIAGNOSIS — Z79.4 TYPE 2 DIABETES MELLITUS WITHOUT COMPLICATION, WITH LONG-TERM CURRENT USE OF INSULIN: Primary | ICD-10-CM

## 2023-05-02 DIAGNOSIS — E11.9 TYPE 2 DIABETES MELLITUS WITHOUT COMPLICATION, WITH LONG-TERM CURRENT USE OF INSULIN: Primary | ICD-10-CM

## 2023-05-02 DIAGNOSIS — L02.415 ABSCESS OF RIGHT LEG: ICD-10-CM

## 2023-05-02 NOTE — TELEPHONE ENCOUNTER
----- Message from Julianne House MA sent at 5/1/2023  2:54 PM CDT -----  Contact: Yisel/ [facility]    ----- Message -----  From: Sada Gallegos  Sent: 5/1/2023   2:29 PM CDT  To: Ginger Royal ([facility]) Staff    Yisel needs a call back at [phone], Regards to getting a fax order saying to stop the wound vac and also what type of wound care the office wants to for his site, Please  fax the orders to [phone].    Thanks  Td

## 2023-05-03 RX ORDER — GALCANEZUMAB 120 MG/ML
120 INJECTION, SOLUTION SUBCUTANEOUS
Qty: 1 ML | Refills: 3 | Status: SHIPPED | OUTPATIENT
Start: 2023-05-03 | End: 2023-05-05

## 2023-05-05 ENCOUNTER — PATIENT MESSAGE (OUTPATIENT)
Dept: FAMILY MEDICINE | Facility: CLINIC | Age: 68
End: 2023-05-05
Payer: MEDICARE

## 2023-05-05 DIAGNOSIS — G43.109 MIGRAINE WITH AURA AND WITHOUT STATUS MIGRAINOSUS, NOT INTRACTABLE: Primary | ICD-10-CM

## 2023-05-05 RX ORDER — ERENUMAB-AOOE 70 MG/ML
70 INJECTION SUBCUTANEOUS
Qty: 1 ML | Refills: 11 | Status: SHIPPED | OUTPATIENT
Start: 2023-05-05 | End: 2023-09-05 | Stop reason: SDUPTHER

## 2023-05-07 ENCOUNTER — DOCUMENT SCAN (OUTPATIENT)
Dept: HOME HEALTH SERVICES | Facility: HOSPITAL | Age: 68
End: 2023-05-07
Payer: MEDICARE

## 2023-05-08 ENCOUNTER — TELEPHONE (OUTPATIENT)
Dept: FAMILY MEDICINE | Facility: CLINIC | Age: 68
End: 2023-05-08
Payer: MEDICARE

## 2023-05-17 ENCOUNTER — PATIENT MESSAGE (OUTPATIENT)
Dept: FAMILY MEDICINE | Facility: CLINIC | Age: 68
End: 2023-05-17
Payer: MEDICARE

## 2023-05-18 ENCOUNTER — PATIENT MESSAGE (OUTPATIENT)
Dept: FAMILY MEDICINE | Facility: CLINIC | Age: 68
End: 2023-05-18
Payer: MEDICARE

## 2023-05-22 ENCOUNTER — OFFICE VISIT (OUTPATIENT)
Dept: FAMILY MEDICINE | Facility: CLINIC | Age: 68
End: 2023-05-22
Payer: COMMERCIAL

## 2023-05-22 VITALS
HEIGHT: 64 IN | HEART RATE: 89 BPM | DIASTOLIC BLOOD PRESSURE: 66 MMHG | OXYGEN SATURATION: 95 % | BODY MASS INDEX: 40.95 KG/M2 | WEIGHT: 239.88 LBS | SYSTOLIC BLOOD PRESSURE: 138 MMHG

## 2023-05-22 DIAGNOSIS — G43.109 MIGRAINE WITH AURA AND WITHOUT STATUS MIGRAINOSUS, NOT INTRACTABLE: ICD-10-CM

## 2023-05-22 DIAGNOSIS — L08.9: Primary | ICD-10-CM

## 2023-05-22 DIAGNOSIS — E11.51 TYPE 2 DIABETES MELLITUS WITH DIABETIC PERIPHERAL ANGIOPATHY WITHOUT GANGRENE, WITH LONG-TERM CURRENT USE OF INSULIN: ICD-10-CM

## 2023-05-22 DIAGNOSIS — E66.01 MORBID (SEVERE) OBESITY DUE TO EXCESS CALORIES: ICD-10-CM

## 2023-05-22 DIAGNOSIS — L97.909: Primary | ICD-10-CM

## 2023-05-22 DIAGNOSIS — Z79.4 TYPE 2 DIABETES MELLITUS WITH DIABETIC PERIPHERAL ANGIOPATHY WITHOUT GANGRENE, WITH LONG-TERM CURRENT USE OF INSULIN: ICD-10-CM

## 2023-05-22 PROBLEM — D80.1 HYPOGAMMAGLOBULINEMIA: Status: RESOLVED | Noted: 2021-06-11 | Resolved: 2023-05-22

## 2023-05-22 PROCEDURE — 99214 PR OFFICE/OUTPT VISIT, EST, LEVL IV, 30-39 MIN: ICD-10-PCS | Mod: S$GLB,,, | Performed by: FAMILY MEDICINE

## 2023-05-22 PROCEDURE — 99214 OFFICE O/P EST MOD 30 MIN: CPT | Mod: S$GLB,,, | Performed by: FAMILY MEDICINE

## 2023-05-23 ENCOUNTER — PATIENT MESSAGE (OUTPATIENT)
Dept: FAMILY MEDICINE | Facility: CLINIC | Age: 68
End: 2023-05-23

## 2023-05-23 PROCEDURE — G0179 MD RECERTIFICATION HHA PT: HCPCS | Mod: ,,, | Performed by: FAMILY MEDICINE

## 2023-05-23 PROCEDURE — G0179 PR HOME HEALTH MD RECERTIFICATION: ICD-10-PCS | Mod: ,,, | Performed by: FAMILY MEDICINE

## 2023-06-12 ENCOUNTER — EXTERNAL HOME HEALTH (OUTPATIENT)
Dept: HOME HEALTH SERVICES | Facility: HOSPITAL | Age: 68
End: 2023-06-12
Payer: COMMERCIAL

## 2023-06-15 ENCOUNTER — DOCUMENT SCAN (OUTPATIENT)
Dept: HOME HEALTH SERVICES | Facility: HOSPITAL | Age: 68
End: 2023-06-15
Payer: MEDICARE

## 2023-06-15 ENCOUNTER — PATIENT MESSAGE (OUTPATIENT)
Dept: FAMILY MEDICINE | Facility: CLINIC | Age: 68
End: 2023-06-15
Payer: MEDICARE

## 2023-06-15 DIAGNOSIS — G43.109 MIGRAINE WITH AURA AND WITHOUT STATUS MIGRAINOSUS, NOT INTRACTABLE: Primary | ICD-10-CM

## 2023-06-19 ENCOUNTER — PATIENT MESSAGE (OUTPATIENT)
Dept: FAMILY MEDICINE | Facility: CLINIC | Age: 68
End: 2023-06-19
Payer: MEDICARE

## 2023-06-19 RX ORDER — UBROGEPANT 100 MG/1
100 TABLET ORAL
Qty: 16 TABLET | Refills: 0 | Status: SHIPPED | OUTPATIENT
Start: 2023-06-19 | End: 2023-09-05 | Stop reason: SDUPTHER

## 2023-06-23 ENCOUNTER — DOCUMENT SCAN (OUTPATIENT)
Dept: HOME HEALTH SERVICES | Facility: HOSPITAL | Age: 68
End: 2023-06-23
Payer: MEDICARE

## 2023-07-06 ENCOUNTER — DOCUMENT SCAN (OUTPATIENT)
Dept: HOME HEALTH SERVICES | Facility: HOSPITAL | Age: 68
End: 2023-07-06
Payer: MEDICARE

## 2023-07-12 ENCOUNTER — PATIENT MESSAGE (OUTPATIENT)
Dept: ADMINISTRATIVE | Facility: HOSPITAL | Age: 68
End: 2023-07-12
Payer: MEDICARE

## 2023-07-13 ENCOUNTER — DOCUMENT SCAN (OUTPATIENT)
Dept: HOME HEALTH SERVICES | Facility: HOSPITAL | Age: 68
End: 2023-07-13
Payer: MEDICARE

## 2023-07-17 ENCOUNTER — OFFICE VISIT (OUTPATIENT)
Dept: FAMILY MEDICINE | Facility: CLINIC | Age: 68
End: 2023-07-17
Payer: COMMERCIAL

## 2023-07-17 VITALS
BODY MASS INDEX: 41.15 KG/M2 | RESPIRATION RATE: 14 BRPM | HEART RATE: 98 BPM | WEIGHT: 241 LBS | SYSTOLIC BLOOD PRESSURE: 83 MMHG | OXYGEN SATURATION: 100 % | HEIGHT: 64 IN | DIASTOLIC BLOOD PRESSURE: 53 MMHG

## 2023-07-17 DIAGNOSIS — M25.552 LEFT HIP PAIN: ICD-10-CM

## 2023-07-17 DIAGNOSIS — E61.1 IRON DEFICIENCY: ICD-10-CM

## 2023-07-17 DIAGNOSIS — E78.5 HYPERLIPIDEMIA, UNSPECIFIED HYPERLIPIDEMIA TYPE: ICD-10-CM

## 2023-07-17 DIAGNOSIS — I10 PRIMARY HYPERTENSION: ICD-10-CM

## 2023-07-17 DIAGNOSIS — M87.00 AVN (AVASCULAR NECROSIS OF BONE): ICD-10-CM

## 2023-07-17 DIAGNOSIS — I25.10 CORONARY ARTERY DISEASE, UNSPECIFIED VESSEL OR LESION TYPE, UNSPECIFIED WHETHER ANGINA PRESENT, UNSPECIFIED WHETHER NATIVE OR TRANSPLANTED HEART: ICD-10-CM

## 2023-07-17 DIAGNOSIS — Z79.4 TYPE 2 DIABETES MELLITUS WITH DIABETIC PERIPHERAL ANGIOPATHY WITHOUT GANGRENE, WITH LONG-TERM CURRENT USE OF INSULIN: ICD-10-CM

## 2023-07-17 DIAGNOSIS — E11.51 TYPE 2 DIABETES MELLITUS WITH DIABETIC PERIPHERAL ANGIOPATHY WITHOUT GANGRENE, WITH LONG-TERM CURRENT USE OF INSULIN: ICD-10-CM

## 2023-07-17 DIAGNOSIS — E53.8 VITAMIN B 12 DEFICIENCY: ICD-10-CM

## 2023-07-17 DIAGNOSIS — Z79.4 TYPE 2 DIABETES MELLITUS WITHOUT COMPLICATION, WITH LONG-TERM CURRENT USE OF INSULIN: Primary | ICD-10-CM

## 2023-07-17 DIAGNOSIS — E11.9 TYPE 2 DIABETES MELLITUS WITHOUT COMPLICATION, WITH LONG-TERM CURRENT USE OF INSULIN: Primary | ICD-10-CM

## 2023-07-17 DIAGNOSIS — D64.9 CHRONIC ANEMIA: ICD-10-CM

## 2023-07-17 PROCEDURE — 99214 OFFICE O/P EST MOD 30 MIN: CPT | Mod: S$GLB,,, | Performed by: PHYSICIAN ASSISTANT

## 2023-07-17 PROCEDURE — 99214 PR OFFICE/OUTPT VISIT, EST, LEVL IV, 30-39 MIN: ICD-10-PCS | Mod: S$GLB,,, | Performed by: PHYSICIAN ASSISTANT

## 2023-07-17 RX ORDER — SEMAGLUTIDE 2.68 MG/ML
2 INJECTION, SOLUTION SUBCUTANEOUS
Qty: 9 ML | Refills: 0 | Status: SHIPPED | OUTPATIENT
Start: 2023-07-17 | End: 2023-08-21

## 2023-07-17 RX ORDER — METHYLPREDNISOLONE 4 MG/1
TABLET ORAL
Qty: 21 EACH | Refills: 0 | Status: SHIPPED | OUTPATIENT
Start: 2023-07-17 | End: 2023-08-07

## 2023-07-17 NOTE — PROGRESS NOTES
Subjective:      Patient ID: Jim yRan is a 67 y.o. male.    Chief Complaint: Follow-up, Hip Pain (Left hip pain), and Shin      HPI  Pt is here today for followup and new complaints:    cc of left hip pain  Acute on chronic . Worse for the past 2-3 days.  Worse with mvmt, better with rest.  S/p hip sx  secondary to AVN  years ago.       Chronic-  Type 2 diabetes-   On ozempic, 1mg dosing for   Pt has decreased lantus 25U nightly, no lows  Stopped regular insulin completely with starting GLP1.  A1c in hospital  was 7.6 down from 8.3   Goal is to elimate lantus if a1c is in 6 range      Current regimen:  Farxiga 5mg daily  Amaryl 4mg BID  Metformin 1000 BID  Lantus 25 U at night,   Ozempic 1 mg weekly(increasing to 2mg this visit)     Mobitz type 1-followup with cardiology      Migraines-  Pt reports chronic migraines for year.  doing better on amovig and ubrelvy. Failed migraine therapies in the past : immitrex, maxalt, fioriciet, propranolol, and topamax   Failed emgality, nurtec     Anemia-patient with anemia which appears to be chronic microcytic in nature.  Likely a mixture of iron deficiency and chronic disease   B12 deficiency- on b12 at home, due for labs    JAROD- started on PO iron and vit C at home    Creatinine -1.24, GFR at 64 most recent.       Gerd - well controlled on protonix      htn - well controlled on current regimen      Hld/cad/pad -on statin     CABG, 4 vessel- 2017, Dr. Malik did procedure.  Currently sees Dr. Paz.     Mobility, AVN - Uses wheelchair for mobility due to BL AVN  2005.  Had multiped failed attempts for replacement due to infections.      Gastric bypass - 2000, had updated weight loss surg labs 1/2023     Review of Systems   Constitutional:  Negative for activity change, appetite change, chills, diaphoresis, fatigue and unexpected weight change.   Eyes:  Negative for visual disturbance.   Respiratory:  Negative for cough, chest tightness, shortness of breath and  wheezing.    Cardiovascular:  Negative for chest pain, palpitations and leg swelling.   Gastrointestinal:  Negative for abdominal pain, constipation, nausea and vomiting.   Musculoskeletal:  Positive for arthralgias and leg pain.   Neurological:  Negative for dizziness, vertigo, tremors, syncope, weakness, light-headedness, numbness and headaches.   Psychiatric/Behavioral:  Negative for agitation, behavioral problems, confusion, decreased concentration, dysphoric mood, hallucinations, self-injury, sleep disturbance and suicidal ideas. The patient is not nervous/anxious and is not hyperactive.      Medication List with Changes/Refills   New Medications    METHYLPREDNISOLONE (MEDROL DOSEPACK) 4 MG TABLET    use as directed    SEMAGLUTIDE (OZEMPIC) 2 MG/DOSE (8 MG/3 ML) PNIJ    Inject 2 mg into the skin every 7 days.   Current Medications    ASPIRIN 81 MG CHEW    Take 81 mg by mouth once daily.    ATORVASTATIN (LIPITOR) 40 MG TABLET    Take 1 tablet (40 mg total) by mouth every evening.    BUPRENORPHINE-NALOXONE 8-2 MG (SUBOXONE) 8-2 MG        DAPAGLIFLOZIN (FARXIGA) 5 MG TAB TABLET    Take 1 tablet (5 mg total) by mouth once daily.    DULOXETINE (CYMBALTA) 30 MG CAPSULE    Take 1 tablet PO in am and 2 tablets PO in PM    ERENUMAB-AOOE (AIMOVIG AUTOINJECTOR) 70 MG/ML AUTOINJECTOR    Inject 1 mL (70 mg total) into the skin every 28 days.    FLASH GLUCOSE SENSOR (FREESTYLE ALESIA 2 SENSOR) KIT    2 each by Misc.(Non-Drug; Combo Route) route every 14 (fourteen) days.    FUROSEMIDE (LASIX) 40 MG TABLET    Take 1 tablet (40 mg total) by mouth once daily.    GABAPENTIN (NEURONTIN) 300 MG CAPSULE    Take 2 capsules (600 mg total) by mouth 3 (three) times daily. 2 caps tID daily    GLIMEPIRIDE (AMARYL) 4 MG TABLET    Take 1 tablet (4 mg total) by mouth 2 (two) times daily.    INSULIN (LANTUS SOLOSTAR U-100 INSULIN) GLARGINE 100 UNITS/ML SUBQ PEN    Inject 25 Units into the skin once daily.    METFORMIN (GLUCOPHAGE) 1000 MG  "TABLET    Take 1 tablet (1,000 mg total) by mouth 2 (two) times daily with meals.    MISCELLANEOUS MEDICAL SUPPLY KIT    [employer] motorized scooter    NITROGLYCERIN (NITROSTAT) 0.4 MG SL TABLET    Place 1 tablet (0.4 mg total) under the tongue every 5 (five) minutes as needed for Chest pain.    OLMESARTAN (BENICAR) 20 MG TABLET    Take 1 tablet (20 mg total) by mouth once daily.    PANTOPRAZOLE (PROTONIX) 40 MG TABLET    Take 1 tablet (40 mg total) by mouth once daily.    PEN NEEDLE, DIABETIC (BD ULTRA-FINE SHORT PEN NEEDLE) 31 GAUGE X 5/16" NDLE    Use as directed    TIZANIDINE (ZANAFLEX) 4 MG TABLET        UBROGEPANT (UBRELVY) 100 MG TABLET    Take 1 tablet (100 mg total) by mouth as needed for Migraine. If symptoms persist or return, may repeat dose after 2 hours. Maximum: 200 mg per 24 hours   Discontinued Medications    SEMAGLUTIDE (OZEMPIC) 1 MG/DOSE (4 MG/3 ML)    Inject 1 mg into the skin every 7 days.        Objective:     Vitals:    07/17/23 1509   BP: (!) 83/53   Pulse: 98   Resp: 14   SpO2: 100%   Weight: 109.3 kg (241 lb)   Height: 5' 4" (1.626 m)        Physical Exam     Constitutional:       Appearance: Normal appearance. He is normal weight.   HENT:      Head: Normocephalic and atraumatic.      Nose: Nose normal.   Eyes:      Conjunctiva/sclera: Conjunctivae normal.      Comments: Eyes tracking normal on exam    Cardiovascular:      Rate and Rhythm: Normal rate and regular rhythm.      Pulses: Normal pulses.      Heart sounds: Normal heart sounds. No murmur heard.    No friction rub. No gallop.   Pulmonary:      Effort: Pulmonary effort is normal. No respiratory distress.      Breath sounds: Normal breath sounds. No stridor. No wheezing, rhonchi or rales.   Musculoskeletal:      Right lower leg: No edema.      Left lower leg: No edema.      Comments: In wheel chair     Skin:     General: Skin is warm and dry.      Capillary Refill: Capillary refill takes less than 2 seconds.   Neurological:      " Mental Status: He is alert and oriented to person, place, and time.   Psychiatric:         Mood and Affect: Mood normal.         Behavior: Behavior normal.         Thought Content: Thought content normal.         Judgment: Judgment normal.     Assessment & Plan:     Type 2 diabetes mellitus without complication, with long-term current use of insulin  -     semaglutide (OZEMPIC) 2 mg/dose (8 mg/3 mL) PnIj; Inject 2 mg into the skin every 7 days.  Dispense: 9 mL; Refill: 0  -     CBC Auto Differential; Future; Expected date: 07/17/2023  -     Comprehensive Metabolic Panel; Future; Expected date: 07/17/2023  -     Hemoglobin A1C; Future; Expected date: 07/17/2023  -     Lipid Panel; Future; Expected date: 07/17/2023  -     TSH w/reflex to FT4; Future; Expected date: 07/17/2023  -     Urinalysis, Reflex to Urine Culture Urine, Clean Catch; Future; Expected date: 07/18/2023  -     Vitamin B12; Future; Expected date: 07/17/2023  -     Iron, TIBC and Ferritin Panel; Future; Expected date: 07/17/2023    Hyperlipidemia, unspecified hyperlipidemia type  -     CBC Auto Differential; Future; Expected date: 07/17/2023  -     Comprehensive Metabolic Panel; Future; Expected date: 07/17/2023  -     Hemoglobin A1C; Future; Expected date: 07/17/2023  -     Lipid Panel; Future; Expected date: 07/17/2023  -     TSH w/reflex to FT4; Future; Expected date: 07/17/2023  -     Urinalysis, Reflex to Urine Culture Urine, Clean Catch; Future; Expected date: 07/18/2023  -     Vitamin B12; Future; Expected date: 07/17/2023  -     Iron, TIBC and Ferritin Panel; Future; Expected date: 07/17/2023    Coronary artery disease, unspecified vessel or lesion type, unspecified whether angina present, unspecified whether native or transplanted heart  -     CBC Auto Differential; Future; Expected date: 07/17/2023  -     Comprehensive Metabolic Panel; Future; Expected date: 07/17/2023  -     Hemoglobin A1C; Future; Expected date: 07/17/2023  -     Lipid  Panel; Future; Expected date: 07/17/2023  -     TSH w/reflex to FT4; Future; Expected date: 07/17/2023  -     Urinalysis, Reflex to Urine Culture Urine, Clean Catch; Future; Expected date: 07/18/2023  -     Vitamin B12; Future; Expected date: 07/17/2023  -     Iron, TIBC and Ferritin Panel; Future; Expected date: 07/17/2023    Type 2 diabetes mellitus with diabetic peripheral angiopathy without gangrene, with long-term current use of insulin  -     CBC Auto Differential; Future; Expected date: 07/17/2023  -     Comprehensive Metabolic Panel; Future; Expected date: 07/17/2023  -     Hemoglobin A1C; Future; Expected date: 07/17/2023  -     Lipid Panel; Future; Expected date: 07/17/2023  -     TSH w/reflex to FT4; Future; Expected date: 07/17/2023  -     Urinalysis, Reflex to Urine Culture Urine, Clean Catch; Future; Expected date: 07/18/2023  -     Vitamin B12; Future; Expected date: 07/17/2023  -     Iron, TIBC and Ferritin Panel; Future; Expected date: 07/17/2023    AVN (avascular necrosis of bone)  Comments:  f/u with [facility] for consult    Chronic anemia  -     CBC Auto Differential; Future; Expected date: 07/17/2023  -     Comprehensive Metabolic Panel; Future; Expected date: 07/17/2023  -     Hemoglobin A1C; Future; Expected date: 07/17/2023  -     Lipid Panel; Future; Expected date: 07/17/2023  -     TSH w/reflex to FT4; Future; Expected date: 07/17/2023  -     Urinalysis, Reflex to Urine Culture Urine, Clean Catch; Future; Expected date: 07/18/2023  -     Vitamin B12; Future; Expected date: 07/17/2023  -     Iron, TIBC and Ferritin Panel; Future; Expected date: 07/17/2023    Iron deficiency  -     CBC Auto Differential; Future; Expected date: 07/17/2023  -     Comprehensive Metabolic Panel; Future; Expected date: 07/17/2023  -     Hemoglobin A1C; Future; Expected date: 07/17/2023  -     Lipid Panel; Future; Expected date: 07/17/2023  -     TSH w/reflex to FT4; Future; Expected date: 07/17/2023  -      Urinalysis, Reflex to Urine Culture Urine, Clean Catch; Future; Expected date: 07/18/2023  -     Vitamin B12; Future; Expected date: 07/17/2023  -     Iron, TIBC and Ferritin Panel; Future; Expected date: 07/17/2023    Primary hypertension  -     CBC Auto Differential; Future; Expected date: 07/17/2023  -     Comprehensive Metabolic Panel; Future; Expected date: 07/17/2023  -     Hemoglobin A1C; Future; Expected date: 07/17/2023  -     Lipid Panel; Future; Expected date: 07/17/2023  -     TSH w/reflex to FT4; Future; Expected date: 07/17/2023  -     Urinalysis, Reflex to Urine Culture Urine, Clean Catch; Future; Expected date: 07/18/2023  -     Vitamin B12; Future; Expected date: 07/17/2023  -     Iron, TIBC and Ferritin Panel; Future; Expected date: 07/17/2023    Vitamin B 12 deficiency  -     Vitamin B12; Future; Expected date: 07/17/2023    Left hip pain  -     methylPREDNISolone (MEDROL DOSEPACK) 4 mg tablet; use as directed  Dispense: 21 each; Refill: 0     Short course of steroids for hip pain,  monitor glucose.  Titrating up ozempic,  If no readings over 200 , then stop lantus     -Patient instructed that our office calls back on all labs and imaging within 1 week of receiving the results. Patient instructed to reach out to our office if they have not heard from us so that we can request the results from the lab/imaging center           Araceli Johnston PA-C

## 2023-08-21 ENCOUNTER — OFFICE VISIT (OUTPATIENT)
Dept: FAMILY MEDICINE | Facility: CLINIC | Age: 68
End: 2023-08-21
Payer: COMMERCIAL

## 2023-08-21 VITALS
HEIGHT: 64 IN | SYSTOLIC BLOOD PRESSURE: 115 MMHG | OXYGEN SATURATION: 96 % | BODY MASS INDEX: 39.76 KG/M2 | DIASTOLIC BLOOD PRESSURE: 58 MMHG | HEART RATE: 92 BPM | WEIGHT: 232.88 LBS

## 2023-08-21 DIAGNOSIS — E53.8 B12 DEFICIENCY: ICD-10-CM

## 2023-08-21 DIAGNOSIS — G62.9 POLYNEUROPATHY: ICD-10-CM

## 2023-08-21 DIAGNOSIS — E11.9 TYPE 2 DIABETES MELLITUS WITHOUT COMPLICATION, WITH LONG-TERM CURRENT USE OF INSULIN: Primary | ICD-10-CM

## 2023-08-21 DIAGNOSIS — G43.109 MIGRAINE WITH AURA AND WITHOUT STATUS MIGRAINOSUS, NOT INTRACTABLE: ICD-10-CM

## 2023-08-21 DIAGNOSIS — N18.30 TYPE 2 DIABETES MELLITUS WITH STAGE 3 CHRONIC KIDNEY DISEASE, WITH LONG-TERM CURRENT USE OF INSULIN, UNSPECIFIED WHETHER STAGE 3A OR 3B CKD: ICD-10-CM

## 2023-08-21 DIAGNOSIS — E78.5 HYPERLIPIDEMIA, UNSPECIFIED HYPERLIPIDEMIA TYPE: ICD-10-CM

## 2023-08-21 DIAGNOSIS — Z79.4 TYPE 2 DIABETES MELLITUS WITHOUT COMPLICATION, WITH LONG-TERM CURRENT USE OF INSULIN: Primary | ICD-10-CM

## 2023-08-21 DIAGNOSIS — F41.1 GENERALIZED ANXIETY DISORDER: ICD-10-CM

## 2023-08-21 DIAGNOSIS — I10 PRIMARY HYPERTENSION: ICD-10-CM

## 2023-08-21 DIAGNOSIS — Z79.4 TYPE 2 DIABETES MELLITUS WITH STAGE 3 CHRONIC KIDNEY DISEASE, WITH LONG-TERM CURRENT USE OF INSULIN, UNSPECIFIED WHETHER STAGE 3A OR 3B CKD: ICD-10-CM

## 2023-08-21 DIAGNOSIS — E11.22 TYPE 2 DIABETES MELLITUS WITH STAGE 3 CHRONIC KIDNEY DISEASE, WITH LONG-TERM CURRENT USE OF INSULIN, UNSPECIFIED WHETHER STAGE 3A OR 3B CKD: ICD-10-CM

## 2023-08-21 DIAGNOSIS — E61.1 IRON DEFICIENCY: ICD-10-CM

## 2023-08-21 DIAGNOSIS — K21.9 GASTROESOPHAGEAL REFLUX DISEASE, UNSPECIFIED WHETHER ESOPHAGITIS PRESENT: ICD-10-CM

## 2023-08-21 DIAGNOSIS — I25.10 CORONARY ARTERY DISEASE, UNSPECIFIED VESSEL OR LESION TYPE, UNSPECIFIED WHETHER ANGINA PRESENT, UNSPECIFIED WHETHER NATIVE OR TRANSPLANTED HEART: ICD-10-CM

## 2023-08-21 PROCEDURE — 99214 OFFICE O/P EST MOD 30 MIN: CPT | Mod: S$GLB,,, | Performed by: PHYSICIAN ASSISTANT

## 2023-08-21 PROCEDURE — 99214 PR OFFICE/OUTPT VISIT, EST, LEVL IV, 30-39 MIN: ICD-10-PCS | Mod: S$GLB,,, | Performed by: PHYSICIAN ASSISTANT

## 2023-08-21 RX ORDER — TIRZEPATIDE 5 MG/.5ML
5 INJECTION, SOLUTION SUBCUTANEOUS
Qty: 4 PEN | Refills: 2 | Status: SHIPPED | OUTPATIENT
Start: 2023-08-21 | End: 2023-11-01

## 2023-08-21 NOTE — PROGRESS NOTES
Subjective:      Patient ID: Jim Ryan is a 67 y.o. male.    Chief Complaint: Follow-up (Patient is here for a follow up visit )      HPI  Pt is here today for followup   Chronic-  Type 2 diabetes-   On ozempic, 2mg  Pt has decreased lantus 25U nightly, no lows  Stopped regular insulin completely with starting GLP1.  A1c   was 6.9 last week, still having some readings over 200, switching to mounjaro  Goal is to elimate lantus      Current regimen:  Farxiga 5mg daily  Amaryl 4mg BID  Metformin 1000 BID  Lantus 25 U at night,   Ozempic 2 mg weekly     Most rec renal fx - GFR 78, Cr. 1.04, BUN 21     Mobitz type 1-follows up with cardiology      Migraines-  Pt reports chronic migraines for years.  doing better on amovig and ubrelvy. Failed migraine therapies in the past : immitrex, maxalt, fioriciet, propranolol, and topamax   Failed emgality, nurtec     Anemia-patient with anemia which appears to be chronic microcytic in nature.  Likely a mixture of iron deficiency and chronic disease. Labs improving      B12 deficiency- on b12 at home SL, labs within normal limits    JAROD- started on PO iron and vit C at home, labs improving     Gerd - well controlled on protonix      htn - well controlled on current regimen      Hld/cad/pad -on statin, labs within normal limits      CABG, 4 vessel- 2017, Dr. Malik did procedure.  Currently sees Dr. Paz.     Mobility, AVN - Uses wheelchair for mobility due to BL AVN  2005.  Had multiped failed attempts for replacement due to infections.      Gastric bypass - 2000, had updated weight loss surg labs 1/2023  Review of Systems   Constitutional:  Negative for activity change, appetite change, chills, fatigue and fever.   HENT:  Negative for nasal congestion, facial swelling and rhinorrhea.    Eyes:  Negative for pain and visual disturbance.   Respiratory:  Negative for cough, chest tightness and shortness of breath.    Cardiovascular:  Negative for chest pain, palpitations, leg  swelling and claudication.   Gastrointestinal:  Negative for abdominal distention, abdominal pain, anal bleeding, blood in stool, change in bowel habit, constipation, diarrhea and change in bowel habit.   Genitourinary:  Negative for difficulty urinating, flank pain and frequency.   Musculoskeletal:  Positive for arthralgias and leg pain.   Integumentary:  Negative for rash.   Neurological:  Negative for dizziness, weakness, numbness and headaches.   Psychiatric/Behavioral:  Negative for self-injury, sleep disturbance and suicidal ideas. The patient is not nervous/anxious.        Medication List with Changes/Refills   New Medications    TIRZEPATIDE (MOUNJARO) 5 MG/0.5 ML PNIJ    Inject 5 mg into the skin every 7 days.   Current Medications    ASPIRIN 81 MG CHEW    Take 81 mg by mouth once daily.    ATORVASTATIN (LIPITOR) 40 MG TABLET    Take 1 tablet (40 mg total) by mouth every evening.    BUPRENORPHINE-NALOXONE 8-2 MG (SUBOXONE) 8-2 MG        DAPAGLIFLOZIN (FARXIGA) 5 MG TAB TABLET    Take 1 tablet (5 mg total) by mouth once daily.    DULOXETINE (CYMBALTA) 30 MG CAPSULE    Take 1 tablet PO in am and 2 tablets PO in PM    ERENUMAB-AOOE (AIMOVIG AUTOINJECTOR) 70 MG/ML AUTOINJECTOR    Inject 1 mL (70 mg total) into the skin every 28 days.    FLASH GLUCOSE SENSOR (FREESTYLE ALESIA 2 SENSOR) KIT    2 each by Misc.(Non-Drug; Combo Route) route every 14 (fourteen) days.    FUROSEMIDE (LASIX) 40 MG TABLET    Take 1 tablet (40 mg total) by mouth once daily.    GABAPENTIN (NEURONTIN) 300 MG CAPSULE    Take 2 capsules (600 mg total) by mouth 3 (three) times daily. 2 caps tID daily    GLIMEPIRIDE (AMARYL) 4 MG TABLET    Take 1 tablet (4 mg total) by mouth 2 (two) times daily.    INSULIN (LANTUS SOLOSTAR U-100 INSULIN) GLARGINE 100 UNITS/ML SUBQ PEN    Inject 25 Units into the skin once daily.    METFORMIN (GLUCOPHAGE) 1000 MG TABLET    Take 1 tablet (1,000 mg total) by mouth 2 (two) times daily with meals.    MISCELLANEOUS  "MEDICAL SUPPLY KIT    [facility] motorized scooter    NITROGLYCERIN (NITROSTAT) 0.4 MG SL TABLET    Place 1 tablet (0.4 mg total) under the tongue every 5 (five) minutes as needed for Chest pain.    OLMESARTAN (BENICAR) 20 MG TABLET    Take 1 tablet (20 mg total) by mouth once daily.    PANTOPRAZOLE (PROTONIX) 40 MG TABLET    Take 1 tablet (40 mg total) by mouth once daily.    PEN NEEDLE, DIABETIC (BD ULTRA-FINE SHORT PEN NEEDLE) 31 GAUGE X 5/16" NDLE    Use as directed    TIZANIDINE (ZANAFLEX) 4 MG TABLET        UBROGEPANT (UBRELVY) 100 MG TABLET    Take 1 tablet (100 mg total) by mouth as needed for Migraine. If symptoms persist or return, may repeat dose after 2 hours. Maximum: 200 mg per 24 hours   Discontinued Medications    SEMAGLUTIDE (OZEMPIC) 2 MG/DOSE (8 MG/3 ML) PNIJ    Inject 2 mg into the skin every 7 days.        Objective:     Vitals:    08/21/23 0923   BP: (!) 115/58   Pulse: 92   SpO2: 96%   Weight: 105.6 kg (232 lb 14.4 oz)   Height: 5' 4" (1.626 m)        Physical Exam   Constitutional:       Appearance: Normal appearance. He is normal weight.   HENT:      Head: Normocephalic and atraumatic.      Nose: Nose normal.   Eyes:      Conjunctiva/sclera: Conjunctivae normal.      Comments: Eyes tracking normal on exam    Cardiovascular:      Rate and Rhythm: Normal rate and regular rhythm.      Pulses: Normal pulses.      Heart sounds: Normal heart sounds. No murmur heard.    No friction rub. No gallop.   Pulmonary:      Effort: Pulmonary effort is normal. No respiratory distress.      Breath sounds: Normal breath sounds. No stridor. No wheezing, rhonchi or rales.   Musculoskeletal:      Right lower leg: No edema.      Left lower leg: No edema.      Comments: In wheel chair     Skin:     General: Skin is warm and dry.      Capillary Refill: Capillary refill takes less than 2 seconds.   Neurological:      Mental Status: He is alert and oriented to person, place, and time.   Psychiatric:         Mood and " Affect: Mood normal.         Behavior: Behavior normal.         Thought Content: Thought content normal.         Judgment: Judgment normal.     Assessment & Plan:     Type 2 diabetes mellitus without complication, with long-term current use of insulin  Comments:  switching to mounjaro,  1 mo f/u  Orders:  -     tirzepatide (MOUNJARO) 5 mg/0.5 mL PnIj; Inject 5 mg into the skin every 7 days.  Dispense: 4 pen ; Refill: 2    Hyperlipidemia, unspecified hyperlipidemia type  Comments:  well controlled , continue current regimen     Iron deficiency  Comments:  labs improving continue current regimen     B12 deficiency  Comments:  labs improving continue current regimen     Primary hypertension  Comments:  well controlled on current                -Patient instructed that our office calls back on all labs and imaging within 1 week of receiving the results. Patient instructed to reach out to our office if they have not heard from us so that we can request the results from the lab/imaging center           Araceli Johnston PA-C

## 2023-09-05 ENCOUNTER — PATIENT MESSAGE (OUTPATIENT)
Dept: FAMILY MEDICINE | Facility: CLINIC | Age: 68
End: 2023-09-05
Payer: COMMERCIAL

## 2023-09-05 RX ORDER — OLMESARTAN MEDOXOMIL 20 MG/1
20 TABLET ORAL DAILY
Qty: 90 TABLET | Refills: 1 | Status: SHIPPED | OUTPATIENT
Start: 2023-09-05 | End: 2024-03-04

## 2023-09-05 RX ORDER — GABAPENTIN 300 MG/1
600 CAPSULE ORAL 3 TIMES DAILY
Qty: 540 CAPSULE | Refills: 0 | Status: SHIPPED | OUTPATIENT
Start: 2023-09-05 | End: 2024-03-28

## 2023-09-05 RX ORDER — DULOXETIN HYDROCHLORIDE 30 MG/1
CAPSULE, DELAYED RELEASE ORAL
Qty: 270 CAPSULE | Refills: 2 | Status: SHIPPED | OUTPATIENT
Start: 2023-09-05

## 2023-09-05 RX ORDER — INSULIN GLARGINE 100 [IU]/ML
25 INJECTION, SOLUTION SUBCUTANEOUS DAILY
Qty: 10 ML | Refills: 5 | Status: SHIPPED | OUTPATIENT
Start: 2023-09-05

## 2023-09-05 RX ORDER — DAPAGLIFLOZIN 5 MG/1
5 TABLET, FILM COATED ORAL DAILY
Qty: 90 TABLET | Refills: 3 | Status: SHIPPED | OUTPATIENT
Start: 2023-09-05 | End: 2024-03-28

## 2023-09-05 RX ORDER — ERENUMAB-AOOE 70 MG/ML
70 INJECTION SUBCUTANEOUS
Qty: 1 ML | Refills: 11 | Status: SHIPPED | OUTPATIENT
Start: 2023-09-05

## 2023-09-05 RX ORDER — FUROSEMIDE 40 MG/1
40 TABLET ORAL DAILY
Qty: 90 TABLET | Refills: 1 | Status: SHIPPED | OUTPATIENT
Start: 2023-09-05 | End: 2024-03-04

## 2023-09-05 RX ORDER — ATORVASTATIN CALCIUM 40 MG/1
40 TABLET, FILM COATED ORAL NIGHTLY
Qty: 90 TABLET | Refills: 3 | Status: SHIPPED | OUTPATIENT
Start: 2023-09-05

## 2023-09-05 RX ORDER — UBROGEPANT 100 MG/1
100 TABLET ORAL
Qty: 16 TABLET | Refills: 0 | Status: SHIPPED | OUTPATIENT
Start: 2023-09-05 | End: 2024-02-28

## 2023-09-05 RX ORDER — METFORMIN HYDROCHLORIDE 1000 MG/1
1000 TABLET ORAL 2 TIMES DAILY WITH MEALS
Qty: 180 TABLET | Refills: 3 | Status: SHIPPED | OUTPATIENT
Start: 2023-09-05 | End: 2024-03-28

## 2023-09-05 RX ORDER — PANTOPRAZOLE SODIUM 40 MG/1
40 TABLET, DELAYED RELEASE ORAL DAILY
Qty: 90 TABLET | Refills: 3 | Status: SHIPPED | OUTPATIENT
Start: 2023-09-05 | End: 2024-03-28

## 2023-09-05 RX ORDER — GLIMEPIRIDE 4 MG/1
4 TABLET ORAL 2 TIMES DAILY
Qty: 180 TABLET | Refills: 2 | Status: SHIPPED | OUTPATIENT
Start: 2023-09-05 | End: 2024-03-28

## 2023-09-08 ENCOUNTER — PATIENT MESSAGE (OUTPATIENT)
Dept: FAMILY MEDICINE | Facility: CLINIC | Age: 68
End: 2023-09-08
Payer: COMMERCIAL

## 2023-10-11 ENCOUNTER — PATIENT MESSAGE (OUTPATIENT)
Dept: ADMINISTRATIVE | Facility: HOSPITAL | Age: 68
End: 2023-10-11
Payer: COMMERCIAL

## 2023-11-01 RX ORDER — SEMAGLUTIDE 2.68 MG/ML
2 INJECTION, SOLUTION SUBCUTANEOUS
Qty: 3 ML | Refills: 11 | Status: SHIPPED | OUTPATIENT
Start: 2023-11-01 | End: 2024-03-28

## 2024-01-10 ENCOUNTER — PATIENT MESSAGE (OUTPATIENT)
Dept: ADMINISTRATIVE | Facility: HOSPITAL | Age: 69
End: 2024-01-10
Payer: COMMERCIAL

## 2024-02-16 ENCOUNTER — OFFICE VISIT (OUTPATIENT)
Dept: FAMILY MEDICINE | Facility: CLINIC | Age: 69
End: 2024-02-16
Payer: COMMERCIAL

## 2024-02-16 VITALS
HEIGHT: 64 IN | BODY MASS INDEX: 39.98 KG/M2 | SYSTOLIC BLOOD PRESSURE: 118 MMHG | RESPIRATION RATE: 15 BRPM | OXYGEN SATURATION: 99 % | HEART RATE: 107 BPM | DIASTOLIC BLOOD PRESSURE: 74 MMHG

## 2024-02-16 DIAGNOSIS — L89.90 PRESSURE INJURY OF SKIN, UNSPECIFIED INJURY STAGE, UNSPECIFIED LOCATION: Primary | ICD-10-CM

## 2024-02-16 DIAGNOSIS — M87.00 AVN (AVASCULAR NECROSIS OF BONE): ICD-10-CM

## 2024-02-16 DIAGNOSIS — Z09 NEED FOR FOLLOW-UP BY HOME HEALTH SERVICE: ICD-10-CM

## 2024-02-16 DIAGNOSIS — L89.90 PRESSURE INJURY OF SKIN, UNSPECIFIED INJURY STAGE, UNSPECIFIED LOCATION: ICD-10-CM

## 2024-02-16 DIAGNOSIS — G47.00 INSOMNIA, UNSPECIFIED TYPE: Primary | ICD-10-CM

## 2024-02-16 DIAGNOSIS — Z74.09 IMMOBILITY: ICD-10-CM

## 2024-02-16 PROCEDURE — 99214 OFFICE O/P EST MOD 30 MIN: CPT | Mod: S$GLB,,, | Performed by: PHYSICIAN ASSISTANT

## 2024-02-16 RX ORDER — BRINZOLAMIDE/BRIMONIDINE TARTRATE 10; 2 MG/ML; MG/ML
SUSPENSION/ DROPS OPHTHALMIC
COMMUNITY
Start: 2024-01-23

## 2024-02-16 RX ORDER — DOXYCYCLINE 100 MG/1
100 CAPSULE ORAL 2 TIMES DAILY
Qty: 20 CAPSULE | Refills: 0 | Status: SHIPPED | OUTPATIENT
Start: 2024-02-16 | End: 2024-02-21

## 2024-02-16 RX ORDER — MUPIROCIN 20 MG/G
OINTMENT TOPICAL 2 TIMES DAILY
Qty: 15 G | Refills: 0 | Status: SHIPPED | OUTPATIENT
Start: 2024-02-16 | End: 2024-02-21

## 2024-02-16 RX ORDER — AMITRIPTYLINE HYDROCHLORIDE 10 MG/1
10 TABLET, FILM COATED ORAL NIGHTLY PRN
Qty: 30 TABLET | Refills: 0 | Status: SHIPPED | OUTPATIENT
Start: 2024-02-16 | End: 2024-02-21 | Stop reason: SDUPTHER

## 2024-02-16 NOTE — PROGRESS NOTES
Subjective:      Patient ID: Jim Ryan is a 68 y.o. male.    Chief Complaint: Follow-up (ULCER )      HPI  Pt is here today with new concerns    Painful ulcer to right buttocks x 3 days.  Reports he has sleeping in the recliner lately which is likely contributing to this pressure point formation.  Patient uses wheelchair for mobility.  His wife is an RN, works ICU and is not home every night to help him transition to the bed.  I am reaching out for home health care for this issue.  Patient also reports that he is only able to bathe every 3-4 days due to his wife's work schedule.  This wound is not open per patient, not draining.  Patient does not have any fecal incontinence.  He is able to urinate into a urinal, but does use a urinary pad that is sometimes not changes frequently as needed due to his limitations in mobility an access to care.  He does have a history of MRSA on his lower extremity requiring IV antibiotics in the past.  Today he denies any fever, chills, nausea, vomiting, muscle aches.    Insomnia- falls asleep but not staying asleep.  Interested in medication management.    Review of Systems   Constitutional:  Negative for activity change, appetite change, chills, diaphoresis, fatigue and unexpected weight change.   Eyes:  Negative for visual disturbance.   Respiratory:  Negative for cough, chest tightness, shortness of breath and wheezing.    Cardiovascular:  Negative for chest pain, palpitations and leg swelling.   Gastrointestinal:  Negative for abdominal pain, constipation, nausea and vomiting.   Integumentary:  Positive for wound.   Neurological:  Negative for dizziness, vertigo, tremors, syncope, weakness, light-headedness, numbness and headaches.   Psychiatric/Behavioral:  Positive for sleep disturbance. Negative for agitation, behavioral problems, confusion, decreased concentration, dysphoric mood, hallucinations, self-injury and suicidal ideas. The patient is not nervous/anxious and is  not hyperactive.        Medication List with Changes/Refills   New Medications    AMITRIPTYLINE (ELAVIL) 10 MG TABLET    Take 1 tablet (10 mg total) by mouth nightly as needed for Insomnia.    DOXYCYCLINE (MONODOX) 100 MG CAPSULE    Take 1 capsule (100 mg total) by mouth 2 (two) times daily. for 10 days    MUPIROCIN (BACTROBAN) 2 % OINTMENT    Apply topically 2 (two) times daily. for 7 days   Current Medications    ASPIRIN 81 MG CHEW    Take 81 mg by mouth once daily.    ATORVASTATIN (LIPITOR) 40 MG TABLET    Take 1 tablet (40 mg total) by mouth every evening.    BUPRENORPHINE-NALOXONE 8-2 MG (SUBOXONE) 8-2 MG        DAPAGLIFLOZIN PROPANEDIOL (FARXIGA) 5 MG TAB TABLET    Take 1 tablet (5 mg total) by mouth once daily.    DULOXETINE (CYMBALTA) 30 MG CAPSULE    Take 1 tablet PO in am and 2 tablets PO in PM    ERENUMAB-AOOE (AIMOVIG AUTOINJECTOR) 70 MG/ML AUTOINJECTOR    Inject 1 mL (70 mg total) into the skin every 28 days.    FLASH GLUCOSE SENSOR (FREESTYLE ALESIA 2 SENSOR) KIT    2 each by Misc.(Non-Drug; Combo Route) route every 14 (fourteen) days.    FUROSEMIDE (LASIX) 40 MG TABLET    Take 1 tablet (40 mg total) by mouth once daily.    GABAPENTIN (NEURONTIN) 300 MG CAPSULE    Take 2 capsules (600 mg total) by mouth 3 (three) times daily. 2 caps tID daily    GLIMEPIRIDE (AMARYL) 4 MG TABLET    Take 1 tablet (4 mg total) by mouth 2 (two) times daily.    INSULIN (LANTUS SOLOSTAR U-100 INSULIN) GLARGINE 100 UNITS/ML SUBQ PEN    Inject 25 Units into the skin once daily.    METFORMIN (GLUCOPHAGE) 1000 MG TABLET    Take 1 tablet (1,000 mg total) by mouth 2 (two) times daily with meals.    MISCELLANEOUS MEDICAL SUPPLY KIT    [facility]    NITROGLYCERIN (NITROSTAT) 0.4 MG SL TABLET    Place 1 tablet (0.4 mg total) under the tongue every 5 (five) minutes as needed for Chest pain.    OLMESARTAN (BENICAR) 20 MG TABLET    Take 1 tablet (20 mg total) by mouth once daily.    PANTOPRAZOLE (PROTONIX) 40 MG TABLET     "Take 1 tablet (40 mg total) by mouth once daily.    PEN NEEDLE, DIABETIC (BD ULTRA-FINE SHORT PEN NEEDLE) 31 GAUGE X 5/16" NDLE    Use as directed    SEMAGLUTIDE (OZEMPIC) 2 MG/DOSE (8 MG/3 ML) PNIJ    Inject 2 mg into the skin every 7 days.    SIMBRINZA 1-0.2 % DRPS        TIZANIDINE (ZANAFLEX) 4 MG TABLET        UBROGEPANT (UBRELVY) 100 MG TABLET    Take 1 tablet (100 mg total) by mouth as needed for Migraine. If symptoms persist or return, may repeat dose after 2 hours. Maximum: 200 mg per 24 hours        Objective:     Vitals:    02/16/24 1000   BP: 118/74   Pulse: 107   Resp: 15   SpO2: 99%   Height: 5' 4" (1.626 m)        Physical Exam  Constitutional:       Appearance: Normal appearance. He is obese.   HENT:      Head: Normocephalic and atraumatic.   Eyes:      Extraocular Movements: Extraocular movements intact.      Pupils: Pupils are equal, round, and reactive to light.   Cardiovascular:      Rate and Rhythm: Normal rate and regular rhythm.      Pulses: Normal pulses.      Heart sounds: Normal heart sounds.   Pulmonary:      Effort: Pulmonary effort is normal.      Breath sounds: Normal breath sounds.   Musculoskeletal:      Comments: Patient in wheelchair for mobility, he is able to stand while holding onto the bed for examined of his buttocks area.   Skin:            Comments: 4-6 mm in diameter area of redness, mildly tender to palpation.  Not open, not draining, no surrounding induration or cellulitis in this area.   Neurological:      Mental Status: He is alert.            Assessment & Plan:     Insomnia, unspecified type  -     amitriptyline (ELAVIL) 10 MG tablet; Take 1 tablet (10 mg total) by mouth nightly as needed for Insomnia.  Dispense: 30 tablet; Refill: 0    Pressure injury of skin, unspecified injury stage, unspecified location  Comments:  keep area clean and dry, covered with clean dry bandage.  take pressure off sight.  Sleep in bed not chair.  Est care with home healt.  Recheck with me " next wk  Orders:  -     mupirocin (BACTROBAN) 2 % ointment; Apply topically 2 (two) times daily. for 7 days  Dispense: 15 g; Refill: 0  -     doxycycline (MONODOX) 100 MG capsule; Take 1 capsule (100 mg total) by mouth 2 (two) times daily. for 10 days  Dispense: 20 capsule; Refill: 0    Immobility  Comments:  referral placed to home health to help with bathing and transfers    AVN (avascular necrosis of bone)  Comments:  referral placed to home health to help with bathing and transfers         Due for chronic followup, will bring pt back next week for wound recheck and followup on chronic conditions   Seeing pt Wednesday, but he will come sooner if wound is worsening or he is unable to see wound care in the interim      -Patient instructed that our office calls back on all labs and imaging within 1 week of receiving the results. Patient instructed to reach out to our office if they have not heard from us so that we can request the results from the lab/imaging center           Araceli Johnston PA-C

## 2024-02-21 ENCOUNTER — OFFICE VISIT (OUTPATIENT)
Dept: PRIMARY CARE CLINIC | Facility: CLINIC | Age: 69
End: 2024-02-21
Payer: COMMERCIAL

## 2024-02-21 ENCOUNTER — TELEPHONE (OUTPATIENT)
Dept: PRIMARY CARE CLINIC | Facility: CLINIC | Age: 69
End: 2024-02-21

## 2024-02-21 VITALS
SYSTOLIC BLOOD PRESSURE: 116 MMHG | BODY MASS INDEX: 39.64 KG/M2 | DIASTOLIC BLOOD PRESSURE: 72 MMHG | WEIGHT: 232.19 LBS | RESPIRATION RATE: 15 BRPM | HEART RATE: 88 BPM | OXYGEN SATURATION: 95 % | HEIGHT: 64 IN

## 2024-02-21 DIAGNOSIS — Z12.5 ENCOUNTER FOR SCREENING FOR MALIGNANT NEOPLASM OF PROSTATE: ICD-10-CM

## 2024-02-21 DIAGNOSIS — I25.10 CORONARY ARTERY DISEASE, UNSPECIFIED VESSEL OR LESION TYPE, UNSPECIFIED WHETHER ANGINA PRESENT, UNSPECIFIED WHETHER NATIVE OR TRANSPLANTED HEART: ICD-10-CM

## 2024-02-21 DIAGNOSIS — E66.01 MORBID (SEVERE) OBESITY DUE TO EXCESS CALORIES: ICD-10-CM

## 2024-02-21 DIAGNOSIS — Z98.84 HISTORY OF ROUX-EN-Y GASTRIC BYPASS: ICD-10-CM

## 2024-02-21 DIAGNOSIS — E41 NUTRITIONAL MARASMUS: Primary | ICD-10-CM

## 2024-02-21 DIAGNOSIS — D64.9 CHRONIC ANEMIA: ICD-10-CM

## 2024-02-21 DIAGNOSIS — E78.5 HYPERLIPIDEMIA, UNSPECIFIED HYPERLIPIDEMIA TYPE: ICD-10-CM

## 2024-02-21 DIAGNOSIS — L89.90 PRESSURE INJURY OF SKIN, UNSPECIFIED INJURY STAGE, UNSPECIFIED LOCATION: ICD-10-CM

## 2024-02-21 DIAGNOSIS — Z79.4 TYPE 2 DIABETES MELLITUS WITH DIABETIC PERIPHERAL ANGIOPATHY WITHOUT GANGRENE, WITH LONG-TERM CURRENT USE OF INSULIN: ICD-10-CM

## 2024-02-21 DIAGNOSIS — E11.51 TYPE 2 DIABETES MELLITUS WITH DIABETIC PERIPHERAL ANGIOPATHY WITHOUT GANGRENE, WITH LONG-TERM CURRENT USE OF INSULIN: ICD-10-CM

## 2024-02-21 DIAGNOSIS — G47.00 INSOMNIA, UNSPECIFIED TYPE: ICD-10-CM

## 2024-02-21 DIAGNOSIS — Z95.1 HX OF CABG: ICD-10-CM

## 2024-02-21 DIAGNOSIS — I10 PRIMARY HYPERTENSION: ICD-10-CM

## 2024-02-21 DIAGNOSIS — F32.A DEPRESSION, UNSPECIFIED DEPRESSION TYPE: ICD-10-CM

## 2024-02-21 PROBLEM — L08.9: Status: RESOLVED | Noted: 2023-05-22 | Resolved: 2024-02-21

## 2024-02-21 PROBLEM — L97.909: Status: RESOLVED | Noted: 2023-05-22 | Resolved: 2024-02-21

## 2024-02-21 PROCEDURE — 99214 OFFICE O/P EST MOD 30 MIN: CPT | Mod: S$GLB,,, | Performed by: PHYSICIAN ASSISTANT

## 2024-02-21 RX ORDER — AMITRIPTYLINE HYDROCHLORIDE 10 MG/1
10 TABLET, FILM COATED ORAL NIGHTLY PRN
Qty: 90 TABLET | Refills: 1 | Status: SHIPPED | OUTPATIENT
Start: 2024-02-21 | End: 2024-05-08

## 2024-02-21 RX ORDER — LATANOPROST 50 UG/ML
SOLUTION/ DROPS OPHTHALMIC
COMMUNITY
Start: 2024-02-08

## 2024-02-21 NOTE — PROGRESS NOTES
Subjective:      Patient ID: Jim Ryan is a 68 y.o. male.    Chief Complaint: Wound Check      HPI  Pt is here today for followup     Pressure ulcer-patient seen by myself for pressure ulcer to buttocks.  Patient has been seen by home health twice now.  OT has provided him with Alternating pressure pads and new Seat cushions     Insomnia-  Added amitriptyline at last visit,  doing well     Chronic-  Type 2 diabetes-     Pt has decreased lantus 20U nightly down from 25U, no lows now.  Was having lows at the 25 unit dosing    A1c   was 6.9 last check, still having some readings over 200, trying to start mounjaro if insurance approves   Goal is to elimate lantus if possible in the future     Current regimen:  Farxiga 5mg daily  Amaryl 4mg BID  Metformin 1000 BID  Lantus 20 U at night,   Ozempic 2 mg weekly     Most rec renal fx - GFR 78, Cr. 1.04, BUN 21        Migraines-  Pt reports chronic migraines for years.  doing better on amovig and ubrelvy. Failed migraine therapies in the past : immitrex, maxalt, fioriciet, propranolol, and topamax   Failed emgality, nurtec     Anemia-patient with anemia which appears to be chronic microcytic in nature.  Likely a mixture of iron deficiency and chronic disease. Labs improving       B12 deficiency- on b12 at home SL, due for labs     JAROD-  on PO iron and vit C at home, due for lab     Gerd - well controlled on protonix        CABG, 4 vessel- 2017, Dr. Malik did procedure.  Currently sees Dr. Paz annualy .  Mobitz type 1-follows up with cardiology   Hld/cad/pad -on statin, labs today   ASA daily      Hypertension - well controlled on current regimen . Benicar 20mg daily . 40mg lasix daily     Mobility, AVN - Uses wheelchair for mobility due to BL AVN  2005.  Had multiped failed attempts for replacement due to infections.      Gastric bypass - 2000, had updated weight loss surg labs 1/2023    Chronic pain-  Suboxone, gabapentin, Zanaflex for chronic pain back and hips      Depression - doing well on cymbalta    Gerd - doing well on protonox     Morbid obesity-mobility limits patient's ability to exercise.  He is a diabetic and is overall in current to eliminate simple sugars, liquid calories, simple carbs.    Review of Systems   Constitutional:  Negative for activity change, appetite change, chills, fatigue and fever.   HENT:  Negative for nasal congestion, facial swelling and rhinorrhea.    Eyes:  Negative for pain and visual disturbance.   Respiratory:  Negative for cough, chest tightness and shortness of breath.    Cardiovascular:  Negative for chest pain, palpitations, leg swelling and claudication.   Gastrointestinal:  Negative for abdominal distention, abdominal pain, anal bleeding, blood in stool, change in bowel habit, constipation and diarrhea.   Genitourinary:  Negative for difficulty urinating, flank pain and frequency.   Integumentary:  Negative for rash.   Neurological:  Negative for dizziness, weakness, numbness and headaches.   Psychiatric/Behavioral:  Negative for self-injury, sleep disturbance and suicidal ideas. The patient is not nervous/anxious.        Medication List with Changes/Refills   Current Medications    ASPIRIN 81 MG CHEW    Take 81 mg by mouth once daily.    ATORVASTATIN (LIPITOR) 40 MG TABLET    Take 1 tablet (40 mg total) by mouth every evening.    BUPRENORPHINE-NALOXONE 8-2 MG (SUBOXONE) 8-2 MG        DAPAGLIFLOZIN PROPANEDIOL (FARXIGA) 5 MG TAB TABLET    Take 1 tablet (5 mg total) by mouth once daily.    DULOXETINE (CYMBALTA) 30 MG CAPSULE    Take 1 tablet PO in am and 2 tablets PO in PM    ERENUMAB-AOOE (AIMOVIG AUTOINJECTOR) 70 MG/ML AUTOINJECTOR    Inject 1 mL (70 mg total) into the skin every 28 days.    FLASH GLUCOSE SENSOR (FREESTYLE ALESIA 2 SENSOR) KIT    2 each by Misc.(Non-Drug; Combo Route) route every 14 (fourteen) days.    FUROSEMIDE (LASIX) 40 MG TABLET    Take 1 tablet (40 mg total) by mouth once daily.    GABAPENTIN (NEURONTIN) 300  "MG CAPSULE    Take 2 capsules (600 mg total) by mouth 3 (three) times daily. 2 caps tID daily    GLIMEPIRIDE (AMARYL) 4 MG TABLET    Take 1 tablet (4 mg total) by mouth 2 (two) times daily.    INSULIN (LANTUS SOLOSTAR U-100 INSULIN) GLARGINE 100 UNITS/ML SUBQ PEN    Inject 25 Units into the skin once daily.    LATANOPROST 0.005 % OPHTHALMIC SOLUTION        METFORMIN (GLUCOPHAGE) 1000 MG TABLET    Take 1 tablet (1,000 mg total) by mouth 2 (two) times daily with meals.    MISCELLANEOUS MEDICAL SUPPLY KIT    [facility] motorized scooter    NITROGLYCERIN (NITROSTAT) 0.4 MG SL TABLET    Place 1 tablet (0.4 mg total) under the tongue every 5 (five) minutes as needed for Chest pain.    OLMESARTAN (BENICAR) 20 MG TABLET    Take 1 tablet (20 mg total) by mouth once daily.    PANTOPRAZOLE (PROTONIX) 40 MG TABLET    Take 1 tablet (40 mg total) by mouth once daily.    PEN NEEDLE, DIABETIC (BD ULTRA-FINE SHORT PEN NEEDLE) 31 GAUGE X 5/16" NDLE    Use as directed    SEMAGLUTIDE (OZEMPIC) 2 MG/DOSE (8 MG/3 ML) PNIJ    Inject 2 mg into the skin every 7 days.    SIMBRINZA 1-0.2 % DRPS        TIZANIDINE (ZANAFLEX) 4 MG TABLET        UBROGEPANT (UBRELVY) 100 MG TABLET    Take 1 tablet (100 mg total) by mouth as needed for Migraine. If symptoms persist or return, may repeat dose after 2 hours. Maximum: 200 mg per 24 hours   Changed and/or Refilled Medications    Modified Medication Previous Medication    AMITRIPTYLINE (ELAVIL) 10 MG TABLET amitriptyline (ELAVIL) 10 MG tablet       Take 1 tablet (10 mg total) by mouth nightly as needed for Insomnia.    Take 1 tablet (10 mg total) by mouth nightly as needed for Insomnia.   Discontinued Medications    DOXYCYCLINE (MONODOX) 100 MG CAPSULE    Take 1 capsule (100 mg total) by mouth 2 (two) times daily. for 10 days    MUPIROCIN (BACTROBAN) 2 % OINTMENT    Apply topically 2 (two) times daily. for 7 days        Objective:     Vitals:    02/21/24 1054   BP: 116/72   Pulse: 88   Resp: 15   SpO2: " "95%   Weight: 105.3 kg (232 lb 3.2 oz)   Height: 5' 4" (1.626 m)        Physical Exam  Constitutional:       General: He is not in acute distress.     Appearance: He is obese. He is not ill-appearing.   HENT:      Head: Normocephalic and atraumatic.      Nose: Nose normal. No congestion or rhinorrhea.   Eyes:      General: No scleral icterus.        Right eye: No discharge.         Left eye: No discharge.      Conjunctiva/sclera: Conjunctivae normal.   Cardiovascular:      Rate and Rhythm: Normal rate and regular rhythm.      Pulses: Normal pulses.      Heart sounds: Normal heart sounds. No murmur heard.     No friction rub. No gallop. No S3 or S4 sounds.   Pulmonary:      Effort: Pulmonary effort is normal. No respiratory distress.      Breath sounds: Normal breath sounds. No wheezing, rhonchi or rales.   Musculoskeletal:      Cervical back: Normal range of motion and neck supple.      Right lower leg: No edema.      Left lower leg: No edema.      Comments:     Electric wheelchair for mobility   Skin:     General: Skin is warm and dry.      Capillary Refill: Capillary refill takes less than 2 seconds.      Comments:     2 small pressure ulcers over left buttocks, not open, not draining, not tender to palpation on exam.  No surrounding redness, induration, cellulitis.   Neurological:      Mental Status: He is alert and oriented to person, place, and time.   Psychiatric:         Mood and Affect: Mood normal.         Behavior: Behavior normal.         Thought Content: Thought content normal.         Judgment: Judgment normal.            Assessment & Plan:     Nutritional marasmus  Comments:  B12 pending, may switch from sublinguals to injectables depending on levels  Orders:  -     Comprehensive Metabolic Panel; Future; Expected date: 02/21/2024  -     CBC Auto Differential; Future; Expected date: 02/21/2024  -     Lipid Panel; Future; Expected date: 02/21/2024  -     Hemoglobin A1C; Future; Expected date: " 02/21/2024  -     T4, Free; Future; Expected date: 02/21/2024  -     TSH; Future; Expected date: 02/21/2024  -     Urinalysis, Reflex to Urine Culture Urine, Clean Catch; Future; Expected date: 02/21/2024  -     PSA, Screening; Future; Expected date: 02/21/2024  -     Microalbumin/creatinine urine ratio; Future  -     PTH, Intact; Future; Expected date: 02/21/2024  -     Vitamin B1; Future; Expected date: 02/21/2024  -     Vitamin B12; Future; Expected date: 02/21/2024  -     Calcitriol (1,25 DI-OH Vitamin D); Future; Expected date: 02/21/2024  -     Folate; Future; Expected date: 02/21/2024  -     Iron, TIBC and Ferritin Panel; Future; Expected date: 02/21/2024  -     Iron, TIBC and Ferritin Panel; Future; Expected date: 02/22/2024    Type 2 diabetes mellitus with diabetic peripheral angiopathy without gangrene, with long-term current use of insulin  Comments:  Working on getting readings below 200.  Working to start Mounjaro.  Patient overall adherent with therapy  Orders:  -     Comprehensive Metabolic Panel; Future; Expected date: 02/21/2024  -     CBC Auto Differential; Future; Expected date: 02/21/2024  -     Lipid Panel; Future; Expected date: 02/21/2024  -     Hemoglobin A1C; Future; Expected date: 02/21/2024  -     T4, Free; Future; Expected date: 02/21/2024  -     TSH; Future; Expected date: 02/21/2024  -     Urinalysis, Reflex to Urine Culture Urine, Clean Catch; Future; Expected date: 02/21/2024  -     PSA, Screening; Future; Expected date: 02/21/2024  -     Microalbumin/creatinine urine ratio; Future  -     PTH, Intact; Future; Expected date: 02/21/2024  -     Vitamin B1; Future; Expected date: 02/21/2024  -     Vitamin B12; Future; Expected date: 02/21/2024  -     Calcitriol (1,25 DI-OH Vitamin D); Future; Expected date: 02/21/2024  -     Folate; Future; Expected date: 02/21/2024  -     Iron, TIBC and Ferritin Panel; Future; Expected date: 02/21/2024  -     Iron, TIBC and Ferritin Panel; Future;  Expected date: 02/22/2024  -     Microalbumin/creatinine urine ratio; Future    Morbid (severe) obesity due to excess calories  Comments:  Monitor diet at home  Orders:  -     Comprehensive Metabolic Panel; Future; Expected date: 02/21/2024  -     CBC Auto Differential; Future; Expected date: 02/21/2024  -     Lipid Panel; Future; Expected date: 02/21/2024  -     Hemoglobin A1C; Future; Expected date: 02/21/2024  -     T4, Free; Future; Expected date: 02/21/2024  -     TSH; Future; Expected date: 02/21/2024  -     Urinalysis, Reflex to Urine Culture Urine, Clean Catch; Future; Expected date: 02/21/2024  -     PSA, Screening; Future; Expected date: 02/21/2024  -     Microalbumin/creatinine urine ratio; Future  -     PTH, Intact; Future; Expected date: 02/21/2024  -     Vitamin B1; Future; Expected date: 02/21/2024  -     Vitamin B12; Future; Expected date: 02/21/2024  -     Calcitriol (1,25 DI-OH Vitamin D); Future; Expected date: 02/21/2024  -     Folate; Future; Expected date: 02/21/2024  -     Iron, TIBC and Ferritin Panel; Future; Expected date: 02/21/2024    Hyperlipidemia, unspecified hyperlipidemia type  Comments:  On statin, labs pending  Orders:  -     Comprehensive Metabolic Panel; Future; Expected date: 02/21/2024  -     CBC Auto Differential; Future; Expected date: 02/21/2024  -     Lipid Panel; Future; Expected date: 02/21/2024  -     Hemoglobin A1C; Future; Expected date: 02/21/2024  -     T4, Free; Future; Expected date: 02/21/2024  -     TSH; Future; Expected date: 02/21/2024  -     Urinalysis, Reflex to Urine Culture Urine, Clean Catch; Future; Expected date: 02/21/2024  -     PSA, Screening; Future; Expected date: 02/21/2024  -     Microalbumin/creatinine urine ratio; Future  -     PTH, Intact; Future; Expected date: 02/21/2024  -     Vitamin B1; Future; Expected date: 02/21/2024  -     Vitamin B12; Future; Expected date: 02/21/2024  -     Calcitriol (1,25 DI-OH Vitamin D); Future; Expected date:  02/21/2024  -     Folate; Future; Expected date: 02/21/2024  -     Iron, TIBC and Ferritin Panel; Future; Expected date: 02/21/2024    Hx of CABG  Comments:  Continue to follow-up with cardiology annually.  Monitor blood pressure and cholesterol  Orders:  -     Comprehensive Metabolic Panel; Future; Expected date: 02/21/2024  -     CBC Auto Differential; Future; Expected date: 02/21/2024  -     Lipid Panel; Future; Expected date: 02/21/2024  -     Hemoglobin A1C; Future; Expected date: 02/21/2024  -     T4, Free; Future; Expected date: 02/21/2024  -     TSH; Future; Expected date: 02/21/2024  -     Urinalysis, Reflex to Urine Culture Urine, Clean Catch; Future; Expected date: 02/21/2024  -     PSA, Screening; Future; Expected date: 02/21/2024  -     Microalbumin/creatinine urine ratio; Future  -     PTH, Intact; Future; Expected date: 02/21/2024  -     Vitamin B1; Future; Expected date: 02/21/2024  -     Vitamin B12; Future; Expected date: 02/21/2024  -     Calcitriol (1,25 DI-OH Vitamin D); Future; Expected date: 02/21/2024  -     Folate; Future; Expected date: 02/21/2024  -     Iron, TIBC and Ferritin Panel; Future; Expected date: 02/21/2024    Coronary artery disease, unspecified vessel or lesion type, unspecified whether angina present, unspecified whether native or transplanted heart  Comments:  Continue to follow-up with cardiology annually. Monitor blood pressure and cholesterol  Orders:  -     Comprehensive Metabolic Panel; Future; Expected date: 02/21/2024  -     CBC Auto Differential; Future; Expected date: 02/21/2024  -     Lipid Panel; Future; Expected date: 02/21/2024  -     Hemoglobin A1C; Future; Expected date: 02/21/2024  -     T4, Free; Future; Expected date: 02/21/2024  -     TSH; Future; Expected date: 02/21/2024  -     Urinalysis, Reflex to Urine Culture Urine, Clean Catch; Future; Expected date: 02/21/2024  -     PSA, Screening; Future; Expected date: 02/21/2024  -     Microalbumin/creatinine  urine ratio; Future  -     PTH, Intact; Future; Expected date: 02/21/2024  -     Vitamin B1; Future; Expected date: 02/21/2024  -     Vitamin B12; Future; Expected date: 02/21/2024  -     Calcitriol (1,25 DI-OH Vitamin D); Future; Expected date: 02/21/2024  -     Folate; Future; Expected date: 02/21/2024  -     Iron, TIBC and Ferritin Panel; Future; Expected date: 02/21/2024    Primary hypertension  Comments:  Well controlled on current regimen  Orders:  -     Comprehensive Metabolic Panel; Future; Expected date: 02/21/2024  -     CBC Auto Differential; Future; Expected date: 02/21/2024  -     Lipid Panel; Future; Expected date: 02/21/2024  -     Hemoglobin A1C; Future; Expected date: 02/21/2024  -     T4, Free; Future; Expected date: 02/21/2024  -     TSH; Future; Expected date: 02/21/2024  -     Urinalysis, Reflex to Urine Culture Urine, Clean Catch; Future; Expected date: 02/21/2024  -     PSA, Screening; Future; Expected date: 02/21/2024  -     Microalbumin/creatinine urine ratio; Future  -     PTH, Intact; Future; Expected date: 02/21/2024  -     Vitamin B1; Future; Expected date: 02/21/2024  -     Vitamin B12; Future; Expected date: 02/21/2024  -     Calcitriol (1,25 DI-OH Vitamin D); Future; Expected date: 02/21/2024  -     Folate; Future; Expected date: 02/21/2024  -     Iron, TIBC and Ferritin Panel; Future; Expected date: 02/21/2024    Chronic anemia  Comments:  Labs pending  Orders:  -     Comprehensive Metabolic Panel; Future; Expected date: 02/21/2024  -     CBC Auto Differential; Future; Expected date: 02/21/2024  -     Lipid Panel; Future; Expected date: 02/21/2024  -     Hemoglobin A1C; Future; Expected date: 02/21/2024  -     T4, Free; Future; Expected date: 02/21/2024  -     TSH; Future; Expected date: 02/21/2024  -     Urinalysis, Reflex to Urine Culture Urine, Clean Catch; Future; Expected date: 02/21/2024  -     PSA, Screening; Future; Expected date: 02/21/2024  -     Microalbumin/creatinine  urine ratio; Future  -     PTH, Intact; Future; Expected date: 02/21/2024  -     Vitamin B1; Future; Expected date: 02/21/2024  -     Vitamin B12; Future; Expected date: 02/21/2024  -     Calcitriol (1,25 DI-OH Vitamin D); Future; Expected date: 02/21/2024  -     Folate; Future; Expected date: 02/21/2024  -     Iron, TIBC and Ferritin Panel; Future; Expected date: 02/21/2024  -     Iron, TIBC and Ferritin Panel; Future; Expected date: 02/22/2024    History of Lluvia-en-Y gastric bypass  Comments:  Deficiency labs pending  Orders:  -     Comprehensive Metabolic Panel; Future; Expected date: 02/21/2024  -     CBC Auto Differential; Future; Expected date: 02/21/2024  -     Lipid Panel; Future; Expected date: 02/21/2024  -     Hemoglobin A1C; Future; Expected date: 02/21/2024  -     T4, Free; Future; Expected date: 02/21/2024  -     TSH; Future; Expected date: 02/21/2024  -     Urinalysis, Reflex to Urine Culture Urine, Clean Catch; Future; Expected date: 02/21/2024  -     PSA, Screening; Future; Expected date: 02/21/2024  -     Microalbumin/creatinine urine ratio; Future  -     PTH, Intact; Future; Expected date: 02/21/2024  -     Vitamin B1; Future; Expected date: 02/21/2024  -     Vitamin B12; Future; Expected date: 02/21/2024  -     Calcitriol (1,25 DI-OH Vitamin D); Future; Expected date: 02/21/2024  -     Folate; Future; Expected date: 02/21/2024  -     Iron, TIBC and Ferritin Panel; Future; Expected date: 02/21/2024  -     Iron, TIBC and Ferritin Panel; Future; Expected date: 02/22/2024    Encounter for screening for malignant neoplasm of prostate  -     PSA, Screening; Future; Expected date: 02/21/2024    Insomnia, unspecified type  Comments:  Well controlled on amitriptyline  Orders:  -     amitriptyline (ELAVIL) 10 MG tablet; Take 1 tablet (10 mg total) by mouth nightly as needed for Insomnia.  Dispense: 90 tablet; Refill: 1    Pressure injury of skin, unspecified injury stage, unspecified  location  Comments:  Continue home health and OT until cleared    Depression, unspecified depression type  Comments:  Doing well on Cymbalta continue current dose       Three-month follow-up    -Patient instructed that our office calls back on all labs and imaging within 1 week of receiving the results. Patient instructed to reach out to our office if they have not heard from us so that we can request the results from the lab/imaging center           Araceli Johnston PA-C     no

## 2024-02-22 ENCOUNTER — TELEPHONE (OUTPATIENT)
Dept: PRIMARY CARE CLINIC | Facility: CLINIC | Age: 69
End: 2024-02-22
Payer: COMMERCIAL

## 2024-02-28 DIAGNOSIS — G43.109 MIGRAINE WITH AURA AND WITHOUT STATUS MIGRAINOSUS, NOT INTRACTABLE: ICD-10-CM

## 2024-02-28 RX ORDER — UBROGEPANT 100 MG/1
TABLET ORAL
Qty: 10 TABLET | Refills: 5 | Status: SHIPPED | OUTPATIENT
Start: 2024-02-28 | End: 2024-03-01 | Stop reason: SDUPTHER

## 2024-03-01 DIAGNOSIS — G43.109 MIGRAINE WITH AURA AND WITHOUT STATUS MIGRAINOSUS, NOT INTRACTABLE: ICD-10-CM

## 2024-03-04 DIAGNOSIS — I25.10 CORONARY ARTERY DISEASE, UNSPECIFIED VESSEL OR LESION TYPE, UNSPECIFIED WHETHER ANGINA PRESENT, UNSPECIFIED WHETHER NATIVE OR TRANSPLANTED HEART: ICD-10-CM

## 2024-03-04 DIAGNOSIS — I10 PRIMARY HYPERTENSION: ICD-10-CM

## 2024-03-04 RX ORDER — FUROSEMIDE 40 MG/1
40 TABLET ORAL
Qty: 90 TABLET | Refills: 0 | OUTPATIENT
Start: 2024-03-04

## 2024-03-04 RX ORDER — OLMESARTAN MEDOXOMIL 20 MG/1
20 TABLET ORAL
Qty: 90 TABLET | Refills: 0 | Status: SHIPPED | OUTPATIENT
Start: 2024-03-04 | End: 2024-03-28

## 2024-03-04 RX ORDER — FUROSEMIDE 40 MG/1
40 TABLET ORAL
Qty: 90 TABLET | Refills: 0 | Status: SHIPPED | OUTPATIENT
Start: 2024-03-04 | End: 2024-03-28

## 2024-03-04 RX ORDER — UBROGEPANT 100 MG/1
TABLET ORAL
Qty: 16 TABLET | Refills: 5 | Status: SHIPPED | OUTPATIENT
Start: 2024-03-04

## 2024-03-05 DIAGNOSIS — R34 DECREASED URINE OUTPUT: Primary | ICD-10-CM

## 2024-03-05 NOTE — TELEPHONE ENCOUNTER
----- Message from Shakira Vincent LPN sent at 3/5/2024  4:24 PM CST -----  Talked with [facility] nurse she said that [facility] was unable to run the CMP because it was not spun within the 2 hours needed. She stated that they are going to redraw patient on tomorrow.

## 2024-03-06 ENCOUNTER — OUTSIDE PLACE OF SERVICE (OUTPATIENT)
Dept: INTERVENTIONAL RADIOLOGY/VASCULAR | Facility: CLINIC | Age: 69
End: 2024-03-06
Payer: COMMERCIAL

## 2024-03-06 PROCEDURE — 36556 INSERT NON-TUNNEL CV CATH: CPT | Mod: RT,,, | Performed by: STUDENT IN AN ORGANIZED HEALTH CARE EDUCATION/TRAINING PROGRAM

## 2024-03-06 PROCEDURE — 77001 FLUOROGUIDE FOR VEIN DEVICE: CPT | Mod: 26,,, | Performed by: STUDENT IN AN ORGANIZED HEALTH CARE EDUCATION/TRAINING PROGRAM

## 2024-03-06 PROCEDURE — 76937 US GUIDE VASCULAR ACCESS: CPT | Mod: 26,,, | Performed by: STUDENT IN AN ORGANIZED HEALTH CARE EDUCATION/TRAINING PROGRAM

## 2024-03-20 ENCOUNTER — OFFICE VISIT (OUTPATIENT)
Dept: PRIMARY CARE CLINIC | Facility: CLINIC | Age: 69
End: 2024-03-20
Payer: COMMERCIAL

## 2024-03-20 VITALS
BODY MASS INDEX: 39.86 KG/M2 | RESPIRATION RATE: 15 BRPM | OXYGEN SATURATION: 96 % | HEART RATE: 100 BPM | DIASTOLIC BLOOD PRESSURE: 82 MMHG | SYSTOLIC BLOOD PRESSURE: 160 MMHG | HEIGHT: 64 IN

## 2024-03-20 DIAGNOSIS — D64.9 CHRONIC ANEMIA: ICD-10-CM

## 2024-03-20 DIAGNOSIS — Z01.89 PHYSICAL THERAPY EVALUATION, INITIAL: Primary | ICD-10-CM

## 2024-03-20 DIAGNOSIS — Z79.4 TYPE 2 DIABETES MELLITUS WITH DIABETIC PERIPHERAL ANGIOPATHY WITHOUT GANGRENE, WITH LONG-TERM CURRENT USE OF INSULIN: ICD-10-CM

## 2024-03-20 DIAGNOSIS — I10 PRIMARY HYPERTENSION: ICD-10-CM

## 2024-03-20 DIAGNOSIS — J40 BRONCHITIS: ICD-10-CM

## 2024-03-20 DIAGNOSIS — N17.9 AKI (ACUTE KIDNEY INJURY): ICD-10-CM

## 2024-03-20 DIAGNOSIS — M13.0 POLYARTHRITIS: ICD-10-CM

## 2024-03-20 DIAGNOSIS — Z09 HOSPITAL DISCHARGE FOLLOW-UP: Primary | ICD-10-CM

## 2024-03-20 DIAGNOSIS — E11.51 TYPE 2 DIABETES MELLITUS WITH DIABETIC PERIPHERAL ANGIOPATHY WITHOUT GANGRENE, WITH LONG-TERM CURRENT USE OF INSULIN: ICD-10-CM

## 2024-03-20 DIAGNOSIS — E78.5 HYPERLIPIDEMIA, UNSPECIFIED HYPERLIPIDEMIA TYPE: ICD-10-CM

## 2024-03-20 DIAGNOSIS — E61.1 IRON DEFICIENCY: ICD-10-CM

## 2024-03-20 DIAGNOSIS — R53.1 WEAKNESS: ICD-10-CM

## 2024-03-20 LAB
ALBUMIN SERPL BCP-MCNC: 3.1 G/DL (ref 3.4–5)
ALBUMIN/GLOBULIN RATIO: 0.81 RATIO (ref 1.1–1.8)
ALP SERPL-CCNC: 90 U/L (ref 46–116)
ALT SERPL W P-5'-P-CCNC: 26 U/L (ref 12–78)
ANION GAP SERPL CALC-SCNC: 13 MMOL/L (ref 3–11)
APPEARANCE, UA: CLEAR
AST SERPL-CCNC: 13 U/L (ref 15–37)
BACTERIA SPEC CULT: ABNORMAL /HPF
BILIRUB SERPL-MCNC: 0.4 MG/DL (ref 0–1)
BILIRUB UR QL STRIP: NEGATIVE MG/DL
BUN SERPL-MCNC: 46 MG/DL (ref 7–18)
BUN/CREAT SERPL: 15.33 RATIO (ref 7–18)
CALCIUM SERPL-MCNC: 9.1 MG/DL (ref 8.8–10.5)
CELLS COUNTED: 100
CHLORIDE SERPL-SCNC: 106 MMOL/L (ref 100–108)
CO2 SERPL-SCNC: 23 MMOL/L (ref 21–32)
COLOR UR: ABNORMAL
CREAT SERPL-MCNC: 3 MG/DL (ref 0.7–1.3)
EOSINOPHIL NFR BLD: 1 % (ref 1–3)
ERYTHROCYTE [DISTWIDTH] IN BLOOD BY AUTOMATED COUNT: 17.1 % (ref 12.5–18)
GFR ESTIMATION: 21
GLOBULIN: 3.8 G/DL (ref 2.3–3.5)
GLUCOSE (UA): 1000 MG/DL
GLUCOSE SERPL-MCNC: 215 MG/DL (ref 70–110)
HCT VFR BLD AUTO: 36.2 % (ref 42–52)
HGB BLD-MCNC: 11.1 G/DL (ref 14–18)
HGB UR QL STRIP: 25 /UL
HYPOCHROMIA BLD QL SMEAR: ABNORMAL
KETONES UR QL STRIP: NEGATIVE MG/DL
LEUKOCYTE ESTERASE UR QL STRIP: NEGATIVE /UL
LYMPHOCYTES NFR BLD: 8 % (ref 25–40)
MCH RBC QN AUTO: 26 PG (ref 27–31.2)
MCHC RBC AUTO-ENTMCNC: 30.7 G/DL (ref 31.8–35.4)
MCV RBC AUTO: 84.8 FL (ref 80–97)
MONOCYTES NFR BLD: 8 % (ref 1–15)
NEUTROPHILS # BLD AUTO: 14.6 10*3/UL (ref 1.8–7.7)
NEUTROPHILS NFR BLD: 83 % (ref 37–80)
NITRITE UR QL STRIP: NEGATIVE
NUCLEATED RED BLOOD CELLS: 0 %
PH UR STRIP: 5 PH (ref 5–9)
PLATELETS: 409 10*3/UL (ref 142–424)
POTASSIUM SERPL-SCNC: 3.7 MMOL/L (ref 3.6–5.2)
PROT SERPL-MCNC: 6.9 G/DL (ref 6.4–8.2)
PROT UR QL STRIP: 30 MG/DL
RBC # BLD AUTO: 4.27 10*6/UL (ref 4.7–6.1)
RBC #/AREA URNS HPF: ABNORMAL /HPF (ref 0–2)
RBC MORPH BLD: ABNORMAL
SERVICE COMMENT 03: ABNORMAL
SMALL PLATELETS BLD QL SMEAR: NORMAL
SODIUM BLD-SCNC: 142 MMOL/L (ref 135–145)
SP GR UR STRIP: 1.01 (ref 1–1.03)
SPECIMEN COLLECTION METHOD, URINE: ABNORMAL
SQUAMOUS EPITHELIAL, UA: ABNORMAL /LPF
UROBILINOGEN UR STRIP-ACNC: NORMAL MG/DL
WBC # BLD: 17.6 10*3/UL (ref 4.6–10.2)
WBC #/AREA URNS HPF: ABNORMAL /HPF (ref 0–5)

## 2024-03-20 PROCEDURE — 99417 PROLNG OP E/M EACH 15 MIN: CPT | Mod: S$GLB,,, | Performed by: PHYSICIAN ASSISTANT

## 2024-03-20 PROCEDURE — 99215 OFFICE O/P EST HI 40 MIN: CPT | Mod: S$GLB,,, | Performed by: PHYSICIAN ASSISTANT

## 2024-03-20 RX ORDER — ALBUTEROL SULFATE 90 UG/1
2 AEROSOL, METERED RESPIRATORY (INHALATION) EVERY 6 HOURS PRN
Qty: 18 G | Refills: 2 | Status: SHIPPED | OUTPATIENT
Start: 2024-03-20

## 2024-03-20 RX ORDER — BLOOD-GLUCOSE SENSOR
EACH MISCELLANEOUS
Qty: 1 EACH | Refills: 1 | Status: SHIPPED | OUTPATIENT
Start: 2024-03-20 | End: 2024-04-17 | Stop reason: SDUPTHER

## 2024-03-20 NOTE — PROGRESS NOTES
Subjective:      Patient ID: Jim Ryan is a 68 y.o. male.    Chief Complaint: H/S FU (WANTS TO ADD PT TO HH )      HPI  Patient is here today for hospital follow-up.  Patient was admitted to [facility] on 03/05/2024 due to decreased urinary output, SHYMA, hyperkalemia.  Patient does have significant history of SHYAM requiring hospital admission.  Patient was discharged on March 14, 2024; this is a summary of his stay.      Upon admission patient had creatinine of 7, lactic acid 4.8.  Elevated white count, suspected UTI.  Patient was admitted to ICU     Patient was started on hemodialysis, clinically improved, labs improved.  Renal function and potassium improved over ICU stay.  Patient had lactic acidosis/severe metabolic acidosis, suspected due to metformin.  Hyperkalemia, SHYAM improved with dialysis.  Renal labs at discharge-BUN 58, creatinine 4.43, GFR 13, potassium 3.8.  He has follow-up with Nephrology in June.    During hospital stay patient was diagnosed with paroxysmal AFib, placed on Eliquis.  In sinus rhythm on exam today.  He does have follow-up scheduled with Cardiology.  I have instructed him to continue Eliquis until further instructed by Cardiology.      Med management update changes for chronic conditions.  Diabetes-patient was discharged on 20 units insulin at night.  He was instructed to stop metformin, stop Amaryl, stop Ozempic, stop Farxiga.  He has not been checking his blood sugar at home yet.  I instructed him to do so.  Twice a day and follow-up with me with results next week.  He has any lows go to the ER.    Hypertension-patient on amlodipine 5 mg only currently.  His blood pressure is not well controlled, I am increasing back to 10 mg today patient was on Benicar 20 mg daily and Lasix 40 mg daily.  These are both discontinue at his hospital discharge.  Patient denies any symptoms of volume overload since discharge.  Will continue to hold Lasix and Benicar for  now.      Patient reports since hospital discharge he is very weak.  Prior to hospital admission he could transfer from his wheelchair to the toilet and bed without assistance.  Currently he is unable to do these transfers without help.  He is unable to get into a vehicle, patient is having to use disability mobility care, referring for home health physical therapy    Back pain/insomnia-gabapentin was decreased to 100 mg nightly while admitted, patient reports he is still having nightly back pain is having difficulty sleeping at night. he would like to resume amitriptyline, Zanaflex, is asking about gabapentin dosing.      Iron and vitamin-C discontinued while patient was in the hospital, I instructed him to follow-up with Nephrology to see if these medications can be continued    Pt also reports mild cough since DC. No fever no chills. Does reports overall weakness and fatigue      Review of Systems   Constitutional:  Positive for fatigue. Negative for activity change, appetite change, chills, diaphoresis and unexpected weight change.   Eyes:  Negative for visual disturbance.   Respiratory:  Positive for cough. Negative for chest tightness, shortness of breath and wheezing.    Cardiovascular:  Negative for chest pain, palpitations and leg swelling.   Gastrointestinal:  Negative for abdominal pain, constipation, nausea and vomiting.   Neurological:  Positive for weakness. Negative for dizziness, vertigo, tremors, syncope, light-headedness, numbness and headaches.   Psychiatric/Behavioral:  Negative for agitation, behavioral problems, confusion, decreased concentration, dysphoric mood, hallucinations, self-injury, sleep disturbance and suicidal ideas. The patient is not nervous/anxious and is not hyperactive.        Medication List with Changes/Refills   New Medications    ALBUTEROL (PROVENTIL HFA) 90 MCG/ACTUATION INHALER    Inhale 2 puffs into the lungs every 6 (six) hours as needed for Wheezing. Rescue     "BLOOD-GLUCOSE SENSOR (FREESTYLE ALESIA 3 SENSOR) AZRA    Disp 1 free style alesia 3 with sensors   Current Medications    AMITRIPTYLINE (ELAVIL) 10 MG TABLET    Take 1 tablet (10 mg total) by mouth nightly as needed for Insomnia.    ASPIRIN 81 MG CHEW    Take 81 mg by mouth once daily.    ATORVASTATIN (LIPITOR) 40 MG TABLET    Take 1 tablet (40 mg total) by mouth every evening.    BUPRENORPHINE-NALOXONE 8-2 MG (SUBOXONE) 8-2 MG        DAPAGLIFLOZIN PROPANEDIOL (FARXIGA) 5 MG TAB TABLET    Take 1 tablet (5 mg total) by mouth once daily.    DULOXETINE (CYMBALTA) 30 MG CAPSULE    Take 1 tablet PO in am and 2 tablets PO in PM    ERENUMAB-AOOE (AIMOVIG AUTOINJECTOR) 70 MG/ML AUTOINJECTOR    Inject 1 mL (70 mg total) into the skin every 28 days.    FLASH GLUCOSE SENSOR (FREESTYLE ALESIA 2 SENSOR) KIT    2 each by Misc.(Non-Drug; Combo Route) route every 14 (fourteen) days.    FUROSEMIDE (LASIX) 40 MG TABLET    TAKE ONE TABLET BY MOUTH ONCE DAILY    GABAPENTIN (NEURONTIN) 300 MG CAPSULE    Take 2 capsules (600 mg total) by mouth 3 (three) times daily. 2 caps tID daily    GLIMEPIRIDE (AMARYL) 4 MG TABLET    Take 1 tablet (4 mg total) by mouth 2 (two) times daily.    INSULIN (LANTUS SOLOSTAR U-100 INSULIN) GLARGINE 100 UNITS/ML SUBQ PEN    Inject 25 Units into the skin once daily.    LATANOPROST 0.005 % OPHTHALMIC SOLUTION        METFORMIN (GLUCOPHAGE) 1000 MG TABLET    Take 1 tablet (1,000 mg total) by mouth 2 (two) times daily with meals.    MISCELLANEOUS MEDICAL SUPPLY KIT    [facility] motor[facility]    NITROGLYCERIN (NITROSTAT) 0.4 MG SL TABLET    Place 1 tablet (0.4 mg total) under the tongue every 5 (five) minutes as needed for Chest pain.    OLMESARTAN (BENICAR) 20 MG TABLET    TAKE ONE TABLET BY MOUTH ONCE DAILY    PANTOPRAZOLE (PROTONIX) 40 MG TABLET    Take 1 tablet (40 mg total) by mouth once daily.    PEN NEEDLE, DIABETIC (BD ULTRA-FINE SHORT PEN NEEDLE) 31 GAUGE X 5/16" NDLE    Use as directed    SEMAGLUTIDE " "(OZEMPIC) 2 MG/DOSE (8 MG/3 ML) PNIJ    Inject 2 mg into the skin every 7 days.    SIMBRINZA 1-0.2 % DRPS        TIZANIDINE (ZANAFLEX) 4 MG TABLET        UBROGEPANT (UBRELVY) 100 MG TABLET    TAKE ONE TABLET BY MOUTH ONCE DAILY AS NEEDED FOR MIGRAINE MAY REPEAT DOSE AFTER 2 HOURS        Objective:     Vitals:    03/20/24 1114   BP: (!) 160/82   Pulse: 100   Resp: 15   SpO2: 96%   Height: 5' 4" (1.626 m)        Physical Exam  Constitutional:       Appearance: Normal appearance. He is obese.   HENT:      Head: Normocephalic and atraumatic.      Nose: Nose normal.   Eyes:      Conjunctiva/sclera: Conjunctivae normal.      Comments: Eyes tracking normal on exam    Cardiovascular:      Rate and Rhythm: Normal rate and regular rhythm.      Pulses: Normal pulses.      Heart sounds: Normal heart sounds. No murmur heard.     No friction rub. No gallop.   Pulmonary:      Effort: Pulmonary effort is normal. No respiratory distress.      Breath sounds: Normal breath sounds. No stridor. No wheezing, rhonchi or rales.   Musculoskeletal:      Right lower leg: No edema.      Left lower leg: No edema.      Comments: Electric wheelchair for mobility    Skin:     General: Skin is warm and dry.      Capillary Refill: Capillary refill takes less than 2 seconds.   Neurological:      Mental Status: He is alert and oriented to person, place, and time.   Psychiatric:         Mood and Affect: Mood normal.         Behavior: Behavior normal.         Thought Content: Thought content normal.         Judgment: Judgment normal.            Assessment & Plan:     Hospital discharge follow-up  -     CBC Auto Differential; Future; Expected date: 03/20/2024  -     Comprehensive Metabolic Panel; Future; Expected date: 03/20/2024  -     Urinalysis, Reflex to Urine Culture Urine, Clean Catch; Future; Expected date: 03/20/2024  -     Cancel: Ambulatory referral/consult to Physical/Occupational Therapy; Future; Expected date: 03/27/2024  -     Ambulatory " referral/consult to Physical/Occupational Therapy; Future; Expected date: 03/27/2024    SHYAM (acute kidney injury)  -     CBC Auto Differential; Future; Expected date: 03/20/2024  -     Comprehensive Metabolic Panel; Future; Expected date: 03/20/2024  -     Urinalysis, Reflex to Urine Culture Urine, Clean Catch; Future; Expected date: 03/20/2024  -     Ambulatory referral/consult to Endocrinology; Future; Expected date: 03/27/2024    Type 2 diabetes mellitus with diabetic peripheral angiopathy without gangrene, with long-term current use of insulin  Comments:  See dictation  Orders:  -     CBC Auto Differential; Future; Expected date: 03/20/2024  -     Comprehensive Metabolic Panel; Future; Expected date: 03/20/2024  -     Urinalysis, Reflex to Urine Culture Urine, Clean Catch; Future; Expected date: 03/20/2024  -     blood-glucose sensor (FREESTYLE ALESIA 3 SENSOR) Brittany; Disp 1 free style alesia 3 with sensors  Dispense: 1 each; Refill: 1  -     Ambulatory referral/consult to Endocrinology; Future; Expected date: 03/27/2024    Iron deficiency  Comments:  Consult with Nephrology on if iron/vitamin-C can be continued  Orders:  -     CBC Auto Differential; Future; Expected date: 03/20/2024  -     Comprehensive Metabolic Panel; Future; Expected date: 03/20/2024  -     Urinalysis, Reflex to Urine Culture Urine, Clean Catch; Future; Expected date: 03/20/2024    Chronic anemia  Comments:  Consult with Nephrology on if iron/vitamin-C can be continued  Orders:  -     CBC Auto Differential; Future; Expected date: 03/20/2024  -     Comprehensive Metabolic Panel; Future; Expected date: 03/20/2024  -     Urinalysis, Reflex to Urine Culture Urine, Clean Catch; Future; Expected date: 03/20/2024    Primary hypertension  Comments:  Hypertension uncontrolled on amlodipine 5 mg, increasing to 10.  Patient to keep log and follow-up with me next week.  Orders:  -     CBC Auto Differential; Future; Expected date: 03/20/2024  -      Comprehensive Metabolic Panel; Future; Expected date: 03/20/2024  -     Urinalysis, Reflex to Urine Culture Urine, Clean Catch; Future; Expected date: 03/20/2024    Hyperlipidemia, unspecified hyperlipidemia type  -     CBC Auto Differential; Future; Expected date: 03/20/2024  -     Comprehensive Metabolic Panel; Future; Expected date: 03/20/2024  -     Urinalysis, Reflex to Urine Culture Urine, Clean Catch; Future; Expected date: 03/20/2024    Bronchitis  -     albuterol (PROVENTIL HFA) 90 mcg/actuation inhaler; Inhale 2 puffs into the lungs every 6 (six) hours as needed for Wheezing. Rescue  Dispense: 18 g; Refill: 2    Weakness  -     Cancel: Ambulatory referral/consult to Physical/Occupational Therapy; Future; Expected date: 03/27/2024  -     Ambulatory referral/consult to Physical/Occupational Therapy; Future; Expected date: 03/27/2024    Polyarthritis           Diabetes-Continue 20 units Lantus at night. Keep blood glucose log BID. Follow-up with me next week. Consulting Endocrine. Continue to hold Ozempic, Farxiga. Will likely discontinue Amaryl and metformin for future use altogether.      Labs reviewed, patient with improving kidney function, still not at baseline at this point.  Creatinine 3, GFR 21, BUN 46.  White blood cell count 17.6, with increased neutrophils and elevated absolute neutrophil count.  I did consult with Dr. Miles about these lab results.  Based on his recent hospital admission she is concerned for recurrence of sepsis.  I spoke with patient and his wife on the phone and recommended they returned to [facility] emergency room for evaluation.  I called and gave report to Tristan ROSALES in the ER .      Total time spent with pt, charting, exam, review of outside hospital records, referral to higher level of care = 90min        Araceli Johnston PA-C

## 2024-03-21 DIAGNOSIS — Z79.899 ENCOUNTER FOR LONG-TERM (CURRENT) DRUG USE: Primary | ICD-10-CM

## 2024-03-24 ENCOUNTER — EXTERNAL HOME HEALTH (OUTPATIENT)
Dept: HOME HEALTH SERVICES | Facility: HOSPITAL | Age: 69
End: 2024-03-24
Payer: COMMERCIAL

## 2024-03-26 ENCOUNTER — OFFICE VISIT (OUTPATIENT)
Dept: PRIMARY CARE CLINIC | Facility: CLINIC | Age: 69
End: 2024-03-26
Payer: COMMERCIAL

## 2024-03-26 DIAGNOSIS — B34.8 PARAINFLUENZA: ICD-10-CM

## 2024-03-26 DIAGNOSIS — Z79.4 TYPE 2 DIABETES MELLITUS WITH OTHER DIABETIC KIDNEY COMPLICATION, WITH LONG-TERM CURRENT USE OF INSULIN: Primary | ICD-10-CM

## 2024-03-26 DIAGNOSIS — I10 HYPERTENSION, UNSPECIFIED TYPE: ICD-10-CM

## 2024-03-26 DIAGNOSIS — E11.29 TYPE 2 DIABETES MELLITUS WITH OTHER DIABETIC KIDNEY COMPLICATION, WITH LONG-TERM CURRENT USE OF INSULIN: Primary | ICD-10-CM

## 2024-03-26 DIAGNOSIS — N17.9 ACUTE KIDNEY INJURY: ICD-10-CM

## 2024-03-26 DIAGNOSIS — E87.29 RESPIRATORY ACIDOSIS: ICD-10-CM

## 2024-03-26 DIAGNOSIS — Z79.899 ENCOUNTER FOR LONG-TERM (CURRENT) DRUG USE: Primary | ICD-10-CM

## 2024-03-26 PROCEDURE — 99215 OFFICE O/P EST HI 40 MIN: CPT | Mod: 95,,, | Performed by: PHYSICIAN ASSISTANT

## 2024-03-26 NOTE — PROGRESS NOTES
Subjective:   The patient location is: [place]   The chief complaint leading to consultation is: followup     Visit type: audiovisual    Face to Face time with patient: 15min  40 minutes of total time spent on the encounter, which includes face to face time and non-face to face time preparing to see the patient (eg, review of tests), Obtaining and/or reviewing separately obtained history, Documenting clinical information in the electronic or other health record, Independently interpreting results (not separately reported) and communicating results to the patient/family/caregiver, or Care coordination (not separately reported).         Each patient to whom he or she provides medical services by telemedicine is:  (1) informed of the relationship between the physician and patient and the respective role of any other health care provider with respect to management of the patient; and (2) notified that he or she may decline to receive medical services by telemedicine and may withdraw from such care at any time.    Notes:         Patient ID: Jim Ryan is a 68 y.o. male.    Chief Complaint: No chief complaint on file.      HPI  Patient is here today for follow-up and new issue.  Patient saw last week by myself for hospital follow-up, had hospital follow-up labs performed that day.  Patient was discovered to have an elevated white blood cell count.  Sent to ER day of hospital followup and dx with parainfluenza, discharged home same day.      Patient had labs drawn by home health on March 22, 2024.  [facility] reach out to me for critically low CO2 level.  I did reach out to patient and his wife.  Patient reports he was having some coughing and shortness of breath.  I recommend that that time that patient go to the emergency room.  Pt did not go to ER but level improved at recheck on Sunday.    Labs from 3/22/2024 and 3/24/2024 scanned into chart.  BUN improved from 42-37   Creatinine improved from  2.4-2.15   GFR improved from 29-33.    CO2 improved from 14.8-19.7  White blood cell count remains elevated, patient is taking steroids.    Today patient reports he is feeling much better.  Reports decreased cough, no shortness of breath, good pulse oximetry at home.  He is having good urinary output.  No fever.    Patient reports since his last visit with me his blood pressure readings have remained elevated-153 to 163/90 to 95.  Patient instructed to increase amlodipine to 10 mg daily      Diabetes-glucose readings at home remain elevated.  Glucose reading at time of visit was 389.  Patient is on his last day of steroids.  I have consulted with Dr. Miles who recommended increasing his nighttime Lantus to 30 units tonight, may increase to 35 units nightly tomorrow night if he still having readings over 250.  He is to discuss readings with the PA at his follow-up thirst  I have placed a referral for him to establish care with Endocrine.  Patient to call to set up appointment.    Still having some weakness from his hospital admission, he is starting physical therapy with home health tomorrow  Review of Systems   Constitutional:  Positive for activity change. Negative for unexpected weight change.   HENT:  Negative for hearing loss, rhinorrhea and trouble swallowing.    Eyes:  Negative for discharge and visual disturbance.   Respiratory:  Positive for wheezing. Negative for chest tightness.    Cardiovascular:  Negative for chest pain and palpitations.   Gastrointestinal:  Negative for blood in stool, constipation, diarrhea and vomiting.   Endocrine: Negative for polydipsia and polyuria.   Genitourinary:  Negative for difficulty urinating, hematuria and urgency.   Musculoskeletal:  Negative for arthralgias, joint swelling and neck pain.   Neurological:  Positive for weakness (unable to transfer from chair to bed as he usually can at baseline). Negative for headaches.   Psychiatric/Behavioral:  Negative for confusion  and dysphoric mood.        Objective:   There were no vitals filed for this visit.     Physical Exam  Constitutional:       Appearance: He is normal weight.   HENT:      Head: Normocephalic and atraumatic.      Right Ear: External ear normal.      Left Ear: External ear normal.      Nose: Nose normal.   Eyes:      Extraocular Movements: Extraocular movements intact.      Conjunctiva/sclera: Conjunctivae normal.   Pulmonary:      Effort: Pulmonary effort is normal.   Musculoskeletal:      Cervical back: Normal range of motion.   Neurological:      Mental Status: He is alert and oriented to person, place, and time.   Psychiatric:         Mood and Affect: Mood normal.         Thought Content: Thought content normal.            Assessment & Plan:     Type 2 diabetes mellitus with other diabetic kidney complication, with long-term current use of insulin  Comments:  Lantus only diabetic care for now.    Acute kidney injury  Comments:  See dictation    Parainfluenza  Comments:  Patient improving, continue nebs    Hypertension, unspecified type  Comments:  See dictation    Respiratory acidosis  Comments:  See dictation  Improving         Plan today is for home health to draw labs on patient tomorrow, Thursday or Friday, and then again Monday.  Patient has follow-up with our clinic on Thursday of this week and then again Monday of next week.    For blood pressure increasing amlodipine to 10 mg daily, patient to discuss log at next visit with PA      BP and glucose log review at next visit

## 2024-03-28 ENCOUNTER — OFFICE VISIT (OUTPATIENT)
Dept: PRIMARY CARE CLINIC | Facility: CLINIC | Age: 69
End: 2024-03-28
Payer: COMMERCIAL

## 2024-03-28 VITALS — DIASTOLIC BLOOD PRESSURE: 82 MMHG | HEART RATE: 92 BPM | SYSTOLIC BLOOD PRESSURE: 176 MMHG

## 2024-03-28 DIAGNOSIS — B34.8 PARAINFLUENZA: ICD-10-CM

## 2024-03-28 DIAGNOSIS — K21.9 GASTROESOPHAGEAL REFLUX DISEASE, UNSPECIFIED WHETHER ESOPHAGITIS PRESENT: ICD-10-CM

## 2024-03-28 DIAGNOSIS — N17.9 ACUTE KIDNEY INJURY SUPERIMPOSED ON CKD: Primary | ICD-10-CM

## 2024-03-28 DIAGNOSIS — Z09 HOSPITAL DISCHARGE FOLLOW-UP: ICD-10-CM

## 2024-03-28 DIAGNOSIS — Z79.4 TYPE 2 DIABETES MELLITUS WITH OTHER DIABETIC KIDNEY COMPLICATION, WITH LONG-TERM CURRENT USE OF INSULIN: ICD-10-CM

## 2024-03-28 DIAGNOSIS — N18.9 ACUTE KIDNEY INJURY SUPERIMPOSED ON CKD: Primary | ICD-10-CM

## 2024-03-28 DIAGNOSIS — I10 PRIMARY HYPERTENSION: ICD-10-CM

## 2024-03-28 DIAGNOSIS — R53.81 PHYSICAL DECONDITIONING: ICD-10-CM

## 2024-03-28 DIAGNOSIS — D64.9 CHRONIC ANEMIA: ICD-10-CM

## 2024-03-28 DIAGNOSIS — G62.9 POLYNEUROPATHY: ICD-10-CM

## 2024-03-28 DIAGNOSIS — I48.0 PAROXYSMAL ATRIAL FIBRILLATION: ICD-10-CM

## 2024-03-28 DIAGNOSIS — E11.29 TYPE 2 DIABETES MELLITUS WITH OTHER DIABETIC KIDNEY COMPLICATION, WITH LONG-TERM CURRENT USE OF INSULIN: ICD-10-CM

## 2024-03-28 PROBLEM — E78.5 DYSLIPIDEMIA: Status: ACTIVE | Noted: 2024-03-28

## 2024-03-28 PROBLEM — G89.4 CHRONIC PAIN SYNDROME: Status: ACTIVE | Noted: 2024-03-28

## 2024-03-28 PROBLEM — E11.9 DM2 (DIABETES MELLITUS, TYPE 2): Status: ACTIVE | Noted: 2024-03-28

## 2024-03-28 PROCEDURE — 99417 PROLNG OP E/M EACH 15 MIN: CPT | Mod: 95,,, | Performed by: PHYSICIAN ASSISTANT

## 2024-03-28 PROCEDURE — 99215 OFFICE O/P EST HI 40 MIN: CPT | Mod: 95,,, | Performed by: PHYSICIAN ASSISTANT

## 2024-03-28 RX ORDER — AMLODIPINE BESYLATE 10 MG/1
10 TABLET ORAL DAILY
Qty: 90 TABLET | Refills: 1 | Status: SHIPPED | OUTPATIENT
Start: 2024-03-28 | End: 2024-03-28 | Stop reason: ALTCHOICE

## 2024-03-28 RX ORDER — GABAPENTIN 100 MG/1
100 CAPSULE ORAL NIGHTLY
COMMUNITY
Start: 2024-03-14 | End: 2024-04-04

## 2024-03-28 RX ORDER — AMLODIPINE BESYLATE 10 MG/1
10 TABLET ORAL DAILY
Qty: 30 TABLET | Refills: 11 | Status: CANCELLED | OUTPATIENT
Start: 2024-03-28 | End: 2025-03-28

## 2024-03-28 RX ORDER — AMOXICILLIN AND CLAVULANATE POTASSIUM 875; 125 MG/1; MG/1
1 TABLET, FILM COATED ORAL EVERY 12 HOURS
COMMUNITY
Start: 2024-03-21 | End: 2024-04-04

## 2024-03-28 RX ORDER — PANTOPRAZOLE SODIUM 20 MG/1
20 TABLET, DELAYED RELEASE ORAL DAILY
Qty: 90 TABLET | Refills: 3 | Status: SHIPPED | OUTPATIENT
Start: 2024-03-28 | End: 2025-03-28

## 2024-03-28 RX ORDER — ALBUTEROL SULFATE 0.83 MG/ML
2.5 SOLUTION RESPIRATORY (INHALATION) 4 TIMES DAILY PRN
COMMUNITY
Start: 2024-03-21

## 2024-03-28 RX ORDER — GABAPENTIN 100 MG/1
100 CAPSULE ORAL NIGHTLY
Qty: 90 CAPSULE | Refills: 3 | Status: SHIPPED | OUTPATIENT
Start: 2024-03-28 | End: 2025-03-28

## 2024-03-28 RX ORDER — PREDNISOLONE ACETATE 10 MG/ML
1 SUSPENSION/ DROPS OPHTHALMIC 4 TIMES DAILY
COMMUNITY
Start: 2024-02-19 | End: 2024-05-08

## 2024-03-28 RX ORDER — PANTOPRAZOLE SODIUM 20 MG/1
20 TABLET, DELAYED RELEASE ORAL DAILY
Qty: 90 TABLET | Refills: 3 | Status: SHIPPED | OUTPATIENT
Start: 2024-03-28 | End: 2024-03-28

## 2024-03-28 RX ORDER — PANTOPRAZOLE SODIUM 20 MG/1
20 TABLET, DELAYED RELEASE ORAL DAILY
COMMUNITY
Start: 2024-03-15 | End: 2024-03-28

## 2024-03-28 RX ORDER — AMLODIPINE BESYLATE 5 MG/1
10 TABLET ORAL DAILY
COMMUNITY
Start: 2024-03-14 | End: 2024-03-28 | Stop reason: SDUPTHER

## 2024-03-28 RX ORDER — DILTIAZEM HYDROCHLORIDE 120 MG/1
120 CAPSULE, EXTENDED RELEASE ORAL DAILY
Qty: 30 CAPSULE | Refills: 11 | Status: SHIPPED | OUTPATIENT
Start: 2024-03-28 | End: 2024-05-12 | Stop reason: SDUPTHER

## 2024-03-28 NOTE — PROGRESS NOTES
Subjective:   The patient location is: [place]   The chief complaint leading to consultation is: Follow-up     Visit type: audiovisual    Face to Face time with patient: 25  40 minutes of total time spent on the encounter, which includes face to face time and non-face to face time preparing to see the patient (eg, review of tests), Obtaining and/or reviewing separately obtained history, Documenting clinical information in the electronic or other health record, Independently interpreting results (not separately reported) and communicating results to the patient/family/caregiver, or Care coordination (not separately reported).       Each patient to whom he or she provides medical services by telemedicine is:  (1) informed of the relationship between the physician and patient and the respective role of any other health care provider with respect to management of the patient; and (2) notified that he or she may decline to receive medical services by telemedicine and may withdraw from such care at any time.    Notes:         Patient ID: Jim Ryan is a 68 y.o. male.    Chief Complaint: Follow-up      HPI    Here today for 3rd follow-up following recent hospitalization following SHYAM on superimposed CKD, Hyperkalemia, Paroxysmal Afib.     At initial hospital f/u visit on 3/20, Pt had cough and WBC 17.6, elevated neutrophils, subsequently referred back to ER, Negative CXR, Viral panel with Parainfluenza, discharged home same day on Prednisone 40mg PO daily x 5 days, Albuterol Neb. and Augmentin x 7 days..  From URI stand point he is doing well, cough and shortness of breath are improving, completed Prednisone yesterday, 1-2 days left of Augmentin. Reports feeling well, denies fevers or decreased urine output.     Labs reviewed from yesterday, trending well (see scanned):  Cr: 2.4--> 2.15--> 1.83  BUN: 42--> 37--> 35  GFR: 29--> 33 --> 39  CO2: 14.8--> 19.7 --> 19.3  WBC: 20.1--> 12.4  Hgb: 9.8--> 10.9  HCT: 30.1 -->  34.2    HTN: Reports blood pressures are stable. In the past 48 hours, max systolic of 170, max diastolic of 110. HR  bpm.  Home health /80 mmHg yesterday (manual)  Home BP this morning 141/111 mmHg,  (automated cuff on forearm)  Home BP at time of visit 176/82 mmHg, HR 92 (automated cuff on forearm)    Diabetes: Glucose remains elevated,  Fasting BG this morning 283. In the past 48 hours he has had readings from 220-350. Majority of day glucose remains above 350. Has increased to Lantus 30units qHS the past 24 hours.    Paroxysmal Afib: Reports HR has remained 100s-115s since hospital discharge. Inpatient Echo revealed EF of 50-55%, Grade II diastolic dysfunction. Denies chest pains or palpitations. Taking Eliquis 2.5 BID as prescribed. Unsure of follow up with cardiology.    SHYAM on CKD: Creatinine improving, currently 1.83. CrCl 58 mL/min base d on Cockcroft-Gault Equation).     Deconditioning: Report still with baseline weakness. Home health visits q 2 days. Eval for home health PT done x 2 days ago. Continues to have difficulty transferring.    Review of Systems   Constitutional:  Positive for activity change. Negative for unexpected weight change.   HENT:  Negative for hearing loss, rhinorrhea and trouble swallowing.    Eyes:  Negative for discharge and visual disturbance.   Respiratory:  Positive for cough (improving). Negative for chest tightness.    Cardiovascular:  Negative for chest pain and palpitations.   Gastrointestinal:  Negative for abdominal pain, blood in stool, constipation, diarrhea and vomiting.   Endocrine: Negative for polydipsia and polyuria.   Genitourinary:  Negative for difficulty urinating, dysuria, flank pain, hematuria and urgency.   Musculoskeletal:  Negative for arthralgias, joint swelling and neck pain.   Neurological:  Positive for weakness (unable to transfer from chair to bed as he usually can at baseline). Negative for headaches.   Psychiatric/Behavioral:   Negative for confusion and dysphoric mood.        Medication List with Changes/Refills   New Medications    DILTIAZEM (DILACOR XR) 120 MG CDCR    Take 1 capsule (120 mg total) by mouth once daily.    GABAPENTIN (NEURONTIN) 100 MG CAPSULE    Take 1 capsule (100 mg total) by mouth every evening.   Current Medications    ALBUTEROL (PROVENTIL HFA) 90 MCG/ACTUATION INHALER    Inhale 2 puffs into the lungs every 6 (six) hours as needed for Wheezing. Rescue    ALBUTEROL (PROVENTIL) 2.5 MG /3 ML (0.083 %) NEBULIZER SOLUTION    Take 2.5 mg by nebulization 4 (four) times daily as needed.    AMITRIPTYLINE (ELAVIL) 10 MG TABLET    Take 1 tablet (10 mg total) by mouth nightly as needed for Insomnia.    AMOXICILLIN-CLAVULANATE 875-125MG (AUGMENTIN) 875-125 MG PER TABLET    Take 1 tablet by mouth every 12 (twelve) hours.    ATORVASTATIN (LIPITOR) 40 MG TABLET    Take 1 tablet (40 mg total) by mouth every evening.    BLOOD-GLUCOSE SENSOR (FREESTYLE ALESIA 3 SENSOR) AZRA    Disp 1 free style alesia 3 with sensors    BUPRENORPHINE-NALOXONE 8-2 MG (SUBOXONE) 8-2 MG        DULOXETINE (CYMBALTA) 30 MG CAPSULE    Take 1 tablet PO in am and 2 tablets PO in PM    ELIQUIS 2.5 MG TAB    Take 2.5 mg by mouth 2 (two) times daily.    ERENUMAB-AOOE (AIMOVIG AUTOINJECTOR) 70 MG/ML AUTOINJECTOR    Inject 1 mL (70 mg total) into the skin every 28 days.    FLASH GLUCOSE SENSOR (FREESTYLE ALESIA 2 SENSOR) KIT    2 each by Misc.(Non-Drug; Combo Route) route every 14 (fourteen) days.    GABAPENTIN (NEURONTIN) 100 MG CAPSULE    Take 100 mg by mouth every evening.    INSULIN (LANTUS SOLOSTAR U-100 INSULIN) GLARGINE 100 UNITS/ML SUBQ PEN    Inject 25 Units into the skin once daily.    LATANOPROST 0.005 % OPHTHALMIC SOLUTION        MISCELLANEOUS MEDICAL SUPPLY KIT    Numotion motorized scooter    NITROGLYCERIN (NITROSTAT) 0.4 MG SL TABLET    Place 1 tablet (0.4 mg total) under the tongue every 5 (five) minutes as needed for Chest pain.    PEN NEEDLE, DIABETIC  "(BD ULTRA-FINE SHORT PEN NEEDLE) 31 GAUGE X 5/16" NDLE    Use as directed    PREDNISOLONE ACETATE (PRED FORTE) 1 % DRPS    Place 1 drop into both eyes 4 (four) times daily.    SIMBRINZA 1-0.2 % DRPS        UBROGEPANT (UBRELVY) 100 MG TABLET    TAKE ONE TABLET BY MOUTH ONCE DAILY AS NEEDED FOR MIGRAINE MAY REPEAT DOSE AFTER 2 HOURS   Changed and/or Refilled Medications    Modified Medication Previous Medication    PANTOPRAZOLE (PROTONIX) 20 MG TABLET pantoprazole (PROTONIX) 40 MG tablet       Take 1 tablet (20 mg total) by mouth once daily.    Take 1 tablet (40 mg total) by mouth once daily.   Discontinued Medications    AMLODIPINE (NORVASC) 5 MG TABLET    Take 10 mg by mouth once daily.    ASPIRIN 81 MG CHEW    Take 81 mg by mouth once daily.    DAPAGLIFLOZIN PROPANEDIOL (FARXIGA) 5 MG TAB TABLET    Take 1 tablet (5 mg total) by mouth once daily.    FUROSEMIDE (LASIX) 40 MG TABLET    TAKE ONE TABLET BY MOUTH ONCE DAILY    GABAPENTIN (NEURONTIN) 300 MG CAPSULE    Take 2 capsules (600 mg total) by mouth 3 (three) times daily. 2 caps tID daily    GLIMEPIRIDE (AMARYL) 4 MG TABLET    Take 1 tablet (4 mg total) by mouth 2 (two) times daily.    METFORMIN (GLUCOPHAGE) 1000 MG TABLET    Take 1 tablet (1,000 mg total) by mouth 2 (two) times daily with meals.    OLMESARTAN (BENICAR) 20 MG TABLET    TAKE ONE TABLET BY MOUTH ONCE DAILY    PANTOPRAZOLE (PROTONIX) 20 MG TABLET    Take 20 mg by mouth once daily.    SEMAGLUTIDE (OZEMPIC) 2 MG/DOSE (8 MG/3 ML) PNIJ    Inject 2 mg into the skin every 7 days.    TIZANIDINE (ZANAFLEX) 4 MG TABLET           Review of patient's allergies indicates:   Allergen Reactions    Zyvox [linezolid]        Objective:     Vitals:    03/28/24 1521   BP: (!) 176/82   Pulse: 92        Physical Exam  Constitutional:       Appearance: He is normal weight.      Comments: Sitting comfortably in recliner. No acute distress.   HENT:      Head: Normocephalic and atraumatic.   Eyes:      Extraocular Movements: " Extraocular movements intact.      Conjunctiva/sclera: Conjunctivae normal.   Pulmonary:      Effort: Pulmonary effort is normal.   Musculoskeletal:      Cervical back: Normal range of motion.   Neurological:      Mental Status: He is alert and oriented to person, place, and time.   Psychiatric:         Mood and Affect: Mood normal.         Thought Content: Thought content normal.            Assessment & Plan:     Acute kidney injury superimposed on CKD  Comments:  Improving  Current CrCl 58mL/min, based on recent creatinine of 1.83    Type 2 diabetes mellitus with other diabetic kidney complication, with long-term current use of insulin  Comments:  BG continues to remain elevated.   Increase Lantus to 35 units qHS  Instructed to contact Dr. Newsome for f/u appointment.    Paroxysmal atrial fibrillation  Comments:  See dictation  Orders:  -     diltiaZEM (DILACOR XR) 120 MG CDCR; Take 1 capsule (120 mg total) by mouth once daily.  Dispense: 30 capsule; Refill: 11    Physical deconditioning  Comments:  Consider admission to LTAC. Will discuss at in person visit on 4/1    Primary hypertension  Comments:  Discontinue Amlodipine.  RX for Diltiazem XR 120mg once daily  Continue BP log  Check home BP machine with home health manual reading for accuracy.  Orders:  -     diltiaZEM (DILACOR XR) 120 MG CDCR; Take 1 capsule (120 mg total) by mouth once daily.  Dispense: 30 capsule; Refill: 11    Polyneuropathy  Comments:  Refill Gabapentin  CrCl 58mL/min  Orders:  -     gabapentin (NEURONTIN) 100 MG capsule; Take 1 capsule (100 mg total) by mouth every evening.  Dispense: 90 capsule; Refill: 3    Gastroesophageal reflux disease, unspecified whether esophagitis present  Comments:  Refill Protonix.  Orders:  -     Discontinue: pantoprazole (PROTONIX) 20 MG tablet; Take 1 tablet (20 mg total) by mouth once daily.  Dispense: 90 tablet; Refill: 3  -     pantoprazole (PROTONIX) 20 MG tablet; Take 1 tablet (20 mg total) by mouth once  daily.  Dispense: 90 tablet; Refill: 3    Chronic anemia  Comments:  Stable    Hospital discharge follow-up    Parainfluenza  Comments:  Improving.    Other orders  -     Discontinue: amLODIPine (NORVASC) 10 MG tablet; Take 1 tablet (10 mg total) by mouth once daily.  Dispense: 90 tablet; Refill: 1      Paroxysmal Atrial Fibrillation.   Consulted Maty Castillo NP with Dr. Paz  Will Rx Cardizem XR 120mg once daily for rate control.   D/C Amlodipine 10mg.  Will increase Eliquis to 5mg BID (CrCl 58mL/min)  F/u with Cardiology next week for monitor and hospital f/u appointment.       Recommended to change next appointment with me on 4/1 to in person visit for full assessment. Labs prior to next visit. ER precautions given. Continue BP/HR and glucose log.      I spent greater than 60 minutes in total in todays visit including face-to-face time with the patient, and time reviewing records/imaging/labs, and documenting.     -Patient instructed that our office calls back on all labs and imaging within 1 week of receiving the results. Patient instructed to reach out to our office if they have not heard from us so that we can request the results from the lab/imaging center      Maryjane Lopez PA-C    Disclaimer: This note may have been prepared using voice recognition software, it may have not been extensively proofed, as such there could be errors within the text such as sound alike errors.

## 2024-04-01 ENCOUNTER — OFFICE VISIT (OUTPATIENT)
Dept: PRIMARY CARE CLINIC | Facility: CLINIC | Age: 69
End: 2024-04-01
Payer: COMMERCIAL

## 2024-04-01 VITALS
RESPIRATION RATE: 16 BRPM | DIASTOLIC BLOOD PRESSURE: 70 MMHG | BODY MASS INDEX: 39.27 KG/M2 | TEMPERATURE: 97 F | SYSTOLIC BLOOD PRESSURE: 130 MMHG | WEIGHT: 230 LBS | HEIGHT: 64 IN | OXYGEN SATURATION: 98 % | HEART RATE: 78 BPM

## 2024-04-01 DIAGNOSIS — N18.9 ACUTE KIDNEY INJURY SUPERIMPOSED ON CKD: ICD-10-CM

## 2024-04-01 DIAGNOSIS — I48.0 PAROXYSMAL ATRIAL FIBRILLATION: ICD-10-CM

## 2024-04-01 DIAGNOSIS — E11.22 TYPE 2 DIABETES MELLITUS WITH STAGE 3 CHRONIC KIDNEY DISEASE, WITH LONG-TERM CURRENT USE OF INSULIN, UNSPECIFIED WHETHER STAGE 3A OR 3B CKD: Primary | ICD-10-CM

## 2024-04-01 DIAGNOSIS — N18.30 TYPE 2 DIABETES MELLITUS WITH STAGE 3 CHRONIC KIDNEY DISEASE, WITH LONG-TERM CURRENT USE OF INSULIN, UNSPECIFIED WHETHER STAGE 3A OR 3B CKD: Primary | ICD-10-CM

## 2024-04-01 DIAGNOSIS — I10 PRIMARY HYPERTENSION: ICD-10-CM

## 2024-04-01 DIAGNOSIS — N17.9 ACUTE KIDNEY INJURY SUPERIMPOSED ON CKD: ICD-10-CM

## 2024-04-01 DIAGNOSIS — R53.81 PHYSICAL DECONDITIONING: ICD-10-CM

## 2024-04-01 DIAGNOSIS — Z79.4 TYPE 2 DIABETES MELLITUS WITH STAGE 3 CHRONIC KIDNEY DISEASE, WITH LONG-TERM CURRENT USE OF INSULIN, UNSPECIFIED WHETHER STAGE 3A OR 3B CKD: Primary | ICD-10-CM

## 2024-04-01 PROCEDURE — 99215 OFFICE O/P EST HI 40 MIN: CPT | Mod: S$GLB,,, | Performed by: PHYSICIAN ASSISTANT

## 2024-04-01 RX ORDER — BLOOD-GLUCOSE SENSOR
1 EACH MISCELLANEOUS
Qty: 9 EACH | Refills: 5 | Status: SHIPPED | OUTPATIENT
Start: 2024-04-01 | End: 2024-04-24

## 2024-04-01 RX ORDER — BLOOD-GLUCOSE SENSOR
EACH MISCELLANEOUS
Qty: 1 EACH | Refills: 3 | Status: CANCELLED | OUTPATIENT
Start: 2024-04-01

## 2024-04-01 NOTE — PROGRESS NOTES
Subjective:     Patient ID: Jim Ryan is a 68 y.o. male.    Chief Complaint: Follow-up      Here today for 4th follow-up following recent hospitalization following SHYAM on superimposed CKD, Hyperkalemia, Paroxysmal Afib.     At initial hospital f/u visit on 3/20, Pt had cough and WBC 17.6, elevated neutrophils, subsequently referred back to ER, Negative CXR, Viral panel with Parainfluenza, discharged home same day on Prednisone 40mg PO daily x 5 days, Albuterol Neb. and Augmentin x 7 days..  From URI stand point he is doing well, cough and shortness of breath are improving/nearly resolved. Completed course of Prednisone and Augmentin as prescribed. Reports feeling well, denies fevers or decreased urine output.     Labs reviewed, trending well (see scanned):  Cr: 2.4--> 2.15--> 1.83-->1.55  BUN: 42--> 37--> 35-->26  GFR: 29--> 33 --> 39-->48  CO2: 14.8--> 19.7 --> 19.3-->22.9  WBC: 20.1--> 12.4-->11.2  Hgb: 9.8--> 10.9-->10.7  HCT: 30.1 --> 34.2-->33.8    HTN: Reports blood pressures have improved since starting Cardizem 120mg, denies side effects.  *Does report automated BP cuff was inaccurate compared to Home health manual readings.    Diabetes: Glucose remains elevated,  Fasting BG this morning 300, however he did not have some lows of 67 yesterday (did not eat until yesterday evening). Currently on 35 Units of Lantus    Paroxysmal Afib: Inpatient Echo revealed EF of 50-55%, Grade II diastolic dysfunction. Denies chest pains or palpitations. Improvement in HR since starting Cardizem 120mg and Eliquis 5mg BID, Has appointment with Cardiology on April 12th.    SHYAM on CKD: Creatinine improving, currently 1.55. CrCl 68 mL/min base d on Cockcroft-Gault Equation).     Deconditioning: Report still with baseline weakness. Home health visits q 2 days. Only Evaluation for home health PT has been completed.     Review of Systems   Constitutional:  Positive for activity change. Negative for unexpected weight change.    HENT:  Negative for hearing loss, rhinorrhea and trouble swallowing.    Eyes:  Negative for discharge and visual disturbance.   Respiratory:  Negative for cough and chest tightness.    Cardiovascular:  Negative for chest pain and palpitations.   Gastrointestinal:  Negative for abdominal pain, blood in stool, constipation, diarrhea and vomiting.   Endocrine: Negative for polydipsia and polyuria.   Genitourinary:  Negative for difficulty urinating, dysuria, flank pain, hematuria and urgency.   Musculoskeletal:  Negative for arthralgias, joint swelling and neck pain.   Neurological:  Positive for weakness (unable to transfer from chair to bed as he usually can at baseline). Negative for headaches.   Psychiatric/Behavioral:  Negative for confusion and dysphoric mood.        Medication List with Changes/Refills   New Medications    BLOOD-GLUCOSE SENSOR (DEXCOM G7 SENSOR) AZRA    1 Device by Misc.(Non-Drug; Combo Route) route every 10 days.   Current Medications    ALBUTEROL (PROVENTIL HFA) 90 MCG/ACTUATION INHALER    Inhale 2 puffs into the lungs every 6 (six) hours as needed for Wheezing. Rescue    ALBUTEROL (PROVENTIL) 2.5 MG /3 ML (0.083 %) NEBULIZER SOLUTION    Take 2.5 mg by nebulization 4 (four) times daily as needed.    AMITRIPTYLINE (ELAVIL) 10 MG TABLET    Take 1 tablet (10 mg total) by mouth nightly as needed for Insomnia.    AMOXICILLIN-CLAVULANATE 875-125MG (AUGMENTIN) 875-125 MG PER TABLET    Take 1 tablet by mouth every 12 (twelve) hours.    ATORVASTATIN (LIPITOR) 40 MG TABLET    Take 1 tablet (40 mg total) by mouth every evening.    BLOOD-GLUCOSE SENSOR (FREESTYLE ALESIA 3 SENSOR) AZRA    Disp 1 free style alesia 3 with sensors    BUPRENORPHINE-NALOXONE 8-2 MG (SUBOXONE) 8-2 MG        DILTIAZEM (DILACOR XR) 120 MG CDCR    Take 1 capsule (120 mg total) by mouth once daily.    DULOXETINE (CYMBALTA) 30 MG CAPSULE    Take 1 tablet PO in am and 2 tablets PO in PM    ERENUMAB-AOOE (AIMOVIG AUTOINJECTOR) 70 MG/ML  "AUTOINJECTOR    Inject 1 mL (70 mg total) into the skin every 28 days.    FLASH GLUCOSE SENSOR (FREESTYLE ALESIA 2 SENSOR) KIT    2 each by Misc.(Non-Drug; Combo Route) route every 14 (fourteen) days.    GABAPENTIN (NEURONTIN) 100 MG CAPSULE    Take 100 mg by mouth every evening.    GABAPENTIN (NEURONTIN) 100 MG CAPSULE    Take 1 capsule (100 mg total) by mouth every evening.    INSULIN (LANTUS SOLOSTAR U-100 INSULIN) GLARGINE 100 UNITS/ML SUBQ PEN    Inject 25 Units into the skin once daily.    LATANOPROST 0.005 % OPHTHALMIC SOLUTION        ViewsterCELLANEOUS MEDICAL SUPPLY KIT    [employer]otion motorized scooter    PANTOPRAZOLE (PROTONIX) 20 MG TABLET    Take 1 tablet (20 mg total) by mouth once daily.    PEN NEEDLE, DIABETIC (BD ULTRA-FINE SHORT PEN NEEDLE) 31 GAUGE X 5/16" NDLE    Use as directed    PREDNISOLONE ACETATE (PRED FORTE) 1 % DRPS    Place 1 drop into both eyes 4 (four) times daily.    SIMBRINZA 1-0.2 % DRPS        UBROGEPANT (UBRELVY) 100 MG TABLET    TAKE ONE TABLET BY MOUTH ONCE DAILY AS NEEDED FOR MIGRAINE MAY REPEAT DOSE AFTER 2 HOURS   Changed and/or Refilled Medications    Modified Medication Previous Medication    APIXABAN (ELIQUIS) 5 MG TAB apixaban (ELIQUIS) 5 mg Tab       Take 1 tablet (5 mg total) by mouth 2 (two) times daily.    Take 5 mg by mouth 2 (two) times daily.   Discontinued Medications    NITROGLYCERIN (NITROSTAT) 0.4 MG SL TABLET    Place 1 tablet (0.4 mg total) under the tongue every 5 (five) minutes as needed for Chest pain.       Review of patient's allergies indicates:   Allergen Reactions    Zyvox [linezolid]        Objective:     Vitals:    04/01/24 0902   BP: 130/70   Pulse: 78   Resp: 16   Temp: 97.2 °F (36.2 °C)        Physical Exam  Constitutional:       General: He is awake. He is not in acute distress.     Appearance: Normal appearance. He is well-developed and normal weight. He is not toxic-appearing.   HENT:      Head: Normocephalic and atraumatic.   Eyes:      Extraocular " Movements: Extraocular movements intact.      Conjunctiva/sclera: Conjunctivae normal.   Cardiovascular:      Rate and Rhythm: Normal rate and regular rhythm.      Pulses: Normal pulses.   Pulmonary:      Effort: Pulmonary effort is normal.      Breath sounds: Normal breath sounds. No wheezing or rales.   Musculoskeletal:      Cervical back: Normal range of motion.      Right lower le+ Pitting Edema present.      Left lower le+ Pitting Edema present.   Skin:     General: Skin is warm and dry.   Neurological:      General: No focal deficit present.      Mental Status: He is alert and oriented to person, place, and time.      Comments: Dependent ambulation via motorized wheelchair   Psychiatric:         Mood and Affect: Mood normal.         Behavior: Behavior is cooperative.         Thought Content: Thought content normal.            Assessment & Plan:     Type 2 diabetes mellitus with stage 3 chronic kidney disease, with long-term current use of insulin, unspecified whether stage 3a or 3b CKD  Comments:  Trial of Dexcom G7.   Start back Ozempic 0.25mg/weekly  Decrease Lantus to 20 units qHS.  Mointor BG closely to avoid lows  Orders:  -     blood-glucose sensor (DEXCOM G7 SENSOR) Brittany; 1 Device by Misc.(Non-Drug; Combo Route) route every 10 days.  Dispense: 9 each; Refill: 5  -     Ambulatory referral/consult to Physical/Occupational Therapy; Future; Expected date: 2024    Paroxysmal atrial fibrillation  Comments:  HR improved on Cardizem XR 120mg once daily  Refill of Eliquis 5mg BID  Keep Cardiology f/u  Contact cardiology for monitor prior to next scheduled appointment.  Orders:  -     apixaban (ELIQUIS) 5 mg Tab; Take 1 tablet (5 mg total) by mouth 2 (two) times daily.  Dispense: 60 tablet; Refill: 2    Acute kidney injury superimposed on CKD  Comments:  Improving. Creatinine at 1.55. CrCl 68 mL/min base d on Cockcroft-Gault Equation).  Orders:  -     Ambulatory referral/consult to  Physical/Occupational Therapy; Future; Expected date: 04/01/2024    Physical deconditioning  Comments:  See dictation.  Orders:  -     Ambulatory referral/consult to Physical/Occupational Therapy; Future; Expected date: 04/01/2024    Primary hypertension  Comments:  Stable on current      Physical deconditioning  Spoke with Marce, Home Health coordinator. She recommended [facility] for LTAC.  Spoke with Chandrakant, stated patient would not qualify for LTAC, as he would have had to be transferred directly from hospital to LTAC.     There is another option for Inpatient Rehab in [facility] ([place] [facility]), however patient's wife concerned about distance. She and patient agree to home health PT at this time. Will add order for increase frequency of PT to 3 x's a week. Specifically working on transfers in/out of chair into bed/vehicle, rehabilitation to baseline function, with goal to transition to outpatient PT.    I spent greater than 60 minutes in total in todays visit including face-to-face time with the patient, and time reviewing records/imaging/labs, and documenting.     -Patient instructed that our office calls back on all labs and imaging within 1 week of receiving the results. Patient instructed to reach out to our office if they have not heard from us so that we can request the results from the lab/imaging center      Maryjane Lopez PA-C    Disclaimer: This note may have been prepared using voice recognition software, it may have not been extensively proofed, as such there could be errors within the text such as sound alike errors.

## 2024-04-04 ENCOUNTER — OFFICE VISIT (OUTPATIENT)
Dept: PRIMARY CARE CLINIC | Facility: CLINIC | Age: 69
End: 2024-04-04
Payer: COMMERCIAL

## 2024-04-04 ENCOUNTER — E-VISIT (OUTPATIENT)
Dept: PRIMARY CARE CLINIC | Facility: CLINIC | Age: 69
End: 2024-04-04
Payer: MEDICARE

## 2024-04-04 DIAGNOSIS — I10 PRIMARY HYPERTENSION: ICD-10-CM

## 2024-04-04 DIAGNOSIS — R53.81 PHYSICAL DECONDITIONING: ICD-10-CM

## 2024-04-04 DIAGNOSIS — I48.0 PAROXYSMAL ATRIAL FIBRILLATION: Primary | ICD-10-CM

## 2024-04-04 DIAGNOSIS — N18.9 ACUTE KIDNEY INJURY SUPERIMPOSED ON CKD: ICD-10-CM

## 2024-04-04 DIAGNOSIS — N17.9 ACUTE KIDNEY INJURY SUPERIMPOSED ON CKD: ICD-10-CM

## 2024-04-04 DIAGNOSIS — E11.29 TYPE 2 DIABETES MELLITUS WITH OTHER DIABETIC KIDNEY COMPLICATION, WITH LONG-TERM CURRENT USE OF INSULIN: ICD-10-CM

## 2024-04-04 DIAGNOSIS — Z79.4 TYPE 2 DIABETES MELLITUS WITH OTHER DIABETIC KIDNEY COMPLICATION, WITH LONG-TERM CURRENT USE OF INSULIN: ICD-10-CM

## 2024-04-04 PROCEDURE — 99215 OFFICE O/P EST HI 40 MIN: CPT | Mod: 95,,, | Performed by: PHYSICIAN ASSISTANT

## 2024-04-04 PROCEDURE — 99499 UNLISTED E&M SERVICE: CPT | Mod: ,,, | Performed by: FAMILY MEDICINE

## 2024-04-04 RX ORDER — SEMAGLUTIDE 0.68 MG/ML
0.25 INJECTION, SOLUTION SUBCUTANEOUS
COMMUNITY
End: 2024-05-08

## 2024-04-04 NOTE — PROGRESS NOTES
Subjective:     The patient location is: [place]  The chief complaint leading to consultation is: Follow up    Visit type: audio only    Face to Face time with patient: 15  40 minutes of total time spent on the encounter, which includes face to face time and non-face to face time preparing to see the patient (eg, review of tests), Obtaining and/or reviewing separately obtained history, Documenting clinical information in the electronic or other health record, Independently interpreting results (not separately reported) and communicating results to the patient/family/caregiver, or Care coordination (not separately reported).       Each patient to whom he or she provides medical services by telemedicine is:  (1) informed of the relationship between the physician and patient and the respective role of any other health care provider with respect to management of the patient; and (2) notified that he or she may decline to receive medical services by telemedicine and may withdraw from such care at any time.    Notes:     Patient ID: Jim Ryan is a 68 y.o. male.    Chief Complaint: Follow-up      Here today for follow-up following recent hospitalization following SHYAM on superimposed CKD, Hyperkalemia, Paroxysmal Afib.     Labs reviewed, trending well (see scanned):  Cr: 2.4--> 2.15--> 1.83-->1.55--> 1.56  BUN: 42--> 37--> 35-->26-->18  GFR: 29--> 33 --> 39-->48--> 47  CO2: 14.8--> 19.7 --> 19.3-->22.9-->24.2  WBC: 20.1--> 12.4-->11.2-->8.0  Hgb: 9.8--> 10.9-->10.7-->10.3  HCT: 30.1 --> 34.2-->33.8-->32.1    HTN: Reports blood pressures have improved since starting Cardizem 120mg, denies side effects.  Home /80, HR 88 today.    Diabetes: Glucose improving. Fasting BG this morning 150. When decreasing to 20 units of Lantus, sugars remained above 300, therefore increased back to 30 units of Lantus, sugars have improved. Restarted Ozempic 0.25mg on 4/1, tolerating this well without side effect.    Paroxysmal Afib:  Inpatient Echo revealed EF of 50-55%, Grade II diastolic dysfunction. Denies chest pains or palpitations. Improvement in HR since starting Cardizem 120mg. Taking Eliquis 5mg BID as directed, Has appointment with Cardiology on April 12th.    SHYAM on CKD: Creatinine stable, currently 1.56. CrCl 68 mL/min based on Cockcroft-Gault Equation.     Deconditioning: Last visit increased frequency of home health PT visits, added specific goals of transfers. Pt reports this is helping. PT coming MWF. Goal will be to transition to outpatient PT as soon as possible.    Review of Systems   Constitutional:  Negative for unexpected weight change.   HENT:  Negative for hearing loss, rhinorrhea and trouble swallowing.    Eyes:  Negative for discharge and visual disturbance.   Respiratory:  Negative for cough and chest tightness.    Cardiovascular:  Negative for chest pain and palpitations.   Gastrointestinal:  Negative for abdominal pain, blood in stool, constipation, diarrhea and vomiting.   Endocrine: Negative for polydipsia and polyuria.   Genitourinary:  Negative for difficulty urinating, dysuria, flank pain, hematuria and urgency.   Musculoskeletal:  Negative for arthralgias, joint swelling and neck pain.   Neurological:  Positive for weakness (unable to transfer from chair to bed as he usually can at baseline, improving). Negative for headaches.   Psychiatric/Behavioral:  Negative for confusion and dysphoric mood.        Medication List with Changes/Refills   Current Medications    ALBUTEROL (PROVENTIL HFA) 90 MCG/ACTUATION INHALER    Inhale 2 puffs into the lungs every 6 (six) hours as needed for Wheezing. Rescue    ALBUTEROL (PROVENTIL) 2.5 MG /3 ML (0.083 %) NEBULIZER SOLUTION    Take 2.5 mg by nebulization 4 (four) times daily as needed.    AMITRIPTYLINE (ELAVIL) 10 MG TABLET    Take 1 tablet (10 mg total) by mouth nightly as needed for Insomnia.    APIXABAN (ELIQUIS) 5 MG TAB    Take 1 tablet (5 mg total) by mouth 2 (two)  "times daily.    ATORVASTATIN (LIPITOR) 40 MG TABLET    Take 1 tablet (40 mg total) by mouth every evening.    BLOOD-GLUCOSE SENSOR (DEXCOM G7 SENSOR) AZRA    1 Device by Misc.(Non-Drug; Combo Route) route every 10 days.    BLOOD-GLUCOSE SENSOR (FREESTYLE ALESIA 3 SENSOR) AZRA    Disp 1 free style alesia 3 with sensors    BUPRENORPHINE-NALOXONE 8-2 MG (SUBOXONE) 8-2 MG        DILTIAZEM (DILACOR XR) 120 MG CDCR    Take 1 capsule (120 mg total) by mouth once daily.    DULOXETINE (CYMBALTA) 30 MG CAPSULE    Take 1 tablet PO in am and 2 tablets PO in PM    ERENUMAB-AOOE (AIMOVIG AUTOINJECTOR) 70 MG/ML AUTOINJECTOR    Inject 1 mL (70 mg total) into the skin every 28 days.    FLASH GLUCOSE SENSOR (FREESTYLE ALESIA 2 SENSOR) KIT    2 each by Misc.(Non-Drug; Combo Route) route every 14 (fourteen) days.    GABAPENTIN (NEURONTIN) 100 MG CAPSULE    Take 1 capsule (100 mg total) by mouth every evening.    INSULIN (LANTUS SOLOSTAR U-100 INSULIN) GLARGINE 100 UNITS/ML SUBQ PEN    Inject 25 Units into the skin once daily.    LATANOPROST 0.005 % OPHTHALMIC SOLUTION        MISCELLANEOUS MEDICAL SUPPLY KIT    [facility]    PANTOPRAZOLE (PROTONIX) 20 MG TABLET    Take 1 tablet (20 mg total) by mouth once daily.    PEN NEEDLE, DIABETIC (BD ULTRA-FINE SHORT PEN NEEDLE) 31 GAUGE X 5/16" NDLE    Use as directed    PREDNISOLONE ACETATE (PRED FORTE) 1 % DRPS    Place 1 drop into both eyes 4 (four) times daily.    SEMAGLUTIDE (OZEMPIC) 0.25 MG OR 0.5 MG (2 MG/3 ML) PEN INJECTOR    Inject 0.25 mg into the skin every 7 days.    SIMBRINZA 1-0.2 % DRPS        UBROGEPANT (UBRELVY) 100 MG TABLET    TAKE ONE TABLET BY MOUTH ONCE DAILY AS NEEDED FOR MIGRAINE MAY REPEAT DOSE AFTER 2 HOURS   Discontinued Medications    AMOXICILLIN-CLAVULANATE 875-125MG (AUGMENTIN) 875-125 MG PER TABLET    Take 1 tablet by mouth every 12 (twelve) hours.    GABAPENTIN (NEURONTIN) 100 MG CAPSULE    Take 100 mg by mouth every evening.       Review of patient's " allergies indicates:   Allergen Reactions    Zyvox [linezolid]        Objective:     There were no vitals filed for this visit.       Physical Exam  Constitutional:       Appearance: He is normal weight.   HENT:      Head: Normocephalic and atraumatic.   Eyes:      Extraocular Movements: Extraocular movements intact.      Conjunctiva/sclera: Conjunctivae normal.   Pulmonary:      Effort: Pulmonary effort is normal.   Musculoskeletal:      Cervical back: Normal range of motion.   Neurological:      Mental Status: He is alert and oriented to person, place, and time.   Psychiatric:         Mood and Affect: Mood normal.         Thought Content: Thought content normal.         Assessment & Plan:     Paroxysmal atrial fibrillation  Comments:  Stable on current.   Keep follow up with Cardiology    Type 2 diabetes mellitus with other diabetic kidney complication, with long-term current use of insulin  Comments:  Improving, Currently on 30units of Lantus and Ozempic 0.25mg weekly.   Will consider increasing Ozempic to 0.5mg next week and potentially decreasing Lantus    Primary hypertension  Comments:  Stable on current    Physical deconditioning  Comments:  Improving with increased frequency of PT.   Goal of quick transition to outpatient PT.    Acute kidney injury superimposed on CKD  Comments:  Creatinine Stable 1.56  GFR 47      Physical deconditioning  frequency of PT to 3 x's a week. Specifically working on transfers in/out of chair into bed/vehicle, rehabilitation to baseline function, with goal to transition to outpatient PT.    I spent greater than 40 minutes in total in todays visit including face-to-face time with the patient, and time reviewing records/imaging/labs, and documenting.     -Patient instructed that our office calls back on all labs and imaging within 1 week of receiving the results. Patient instructed to reach out to our office if they have not heard from us so that we can request the results from  the lab/imaging center      Maryjane Lopez PA-C    Disclaimer: This note may have been prepared using voice recognition software, it may have not been extensively proofed, as such there could be errors within the text such as sound alike errors.

## 2024-04-08 RX ORDER — DILTIAZEM HYDROCHLORIDE 180 MG/1
180 CAPSULE, COATED, EXTENDED RELEASE ORAL DAILY
Qty: 90 CAPSULE | Refills: 1 | Status: SHIPPED | OUTPATIENT
Start: 2024-04-08 | End: 2025-04-08

## 2024-04-09 ENCOUNTER — DOCUMENT SCAN (OUTPATIENT)
Dept: HOME HEALTH SERVICES | Facility: HOSPITAL | Age: 69
End: 2024-04-09
Payer: COMMERCIAL

## 2024-04-09 LAB — HBA1C MFR BLD: 6.9 % (ref 4.2–6.3)

## 2024-04-13 ENCOUNTER — DOCUMENT SCAN (OUTPATIENT)
Dept: HOME HEALTH SERVICES | Facility: HOSPITAL | Age: 69
End: 2024-04-13
Payer: COMMERCIAL

## 2024-04-17 DIAGNOSIS — Z79.4 TYPE 2 DIABETES MELLITUS WITH DIABETIC PERIPHERAL ANGIOPATHY WITHOUT GANGRENE, WITH LONG-TERM CURRENT USE OF INSULIN: ICD-10-CM

## 2024-04-17 DIAGNOSIS — E11.51 TYPE 2 DIABETES MELLITUS WITH DIABETIC PERIPHERAL ANGIOPATHY WITHOUT GANGRENE, WITH LONG-TERM CURRENT USE OF INSULIN: ICD-10-CM

## 2024-04-17 DIAGNOSIS — Z79.899 ENCOUNTER FOR LONG-TERM (CURRENT) DRUG USE: Primary | ICD-10-CM

## 2024-04-17 PROCEDURE — G0179 MD RECERTIFICATION HHA PT: HCPCS | Mod: ,,, | Performed by: PHYSICIAN ASSISTANT

## 2024-04-17 RX ORDER — BLOOD-GLUCOSE SENSOR
EACH MISCELLANEOUS
Refills: 0 | OUTPATIENT
Start: 2024-04-17

## 2024-04-17 RX ORDER — BLOOD-GLUCOSE SENSOR
EACH MISCELLANEOUS
Qty: 1 EACH | Refills: 3 | Status: SHIPPED | OUTPATIENT
Start: 2024-04-17 | End: 2024-06-11

## 2024-04-18 ENCOUNTER — TELEPHONE (OUTPATIENT)
Dept: PRIMARY CARE CLINIC | Facility: CLINIC | Age: 69
End: 2024-04-18
Payer: COMMERCIAL

## 2024-04-18 LAB
ABS NRBC COUNT: 0 X 10 3/UL (ref 0–0.01)
ABSOLUTE BASOPHIL: 0.07 X 10 3/UL (ref 0–0.22)
ABSOLUTE EOSINOPHIL: 0.42 X 10 3/UL (ref 0.04–0.54)
ABSOLUTE IMMATURE GRAN: 0.03 X 10 3/UL (ref 0–0.04)
ABSOLUTE LYMPHOCYTE: 2.4 X 10 3/UL (ref 0.86–4.75)
ABSOLUTE MONOCYTE: 0.59 X 10 3/UL (ref 0.22–1.08)
ALBUMIN SERPL-MCNC: 4.5 G/DL (ref 3.5–5.2)
ALBUMIN/GLOB SERPL ELPH: 1.6 {RATIO} (ref 1–2.7)
ALP ISOS SERPL LEV INH-CCNC: 89 U/L (ref 40–130)
ALT (SGPT): 19 U/L (ref 0–41)
ANION GAP SERPL CALC-SCNC: 12 MMOL/L (ref 8–17)
AST SERPL-CCNC: 24 U/L (ref 0–40)
BASOPHILS NFR BLD: 0.9 % (ref 0.2–1.2)
BILIRUBIN, TOTAL: 0.39 MG/DL (ref 0–1.2)
BUN/CREAT SERPL: 13 (ref 6–20)
CALCIUM SERPL-MCNC: 10.6 MG/DL (ref 8.6–10.2)
CARBON DIOXIDE, CO2: 30 MMOL/L (ref 22–29)
CHLORIDE: 96 MMOL/L (ref 98–107)
CREAT SERPL-MCNC: 1.36 MG/DL (ref 0.7–1.2)
EOSINOPHIL NFR BLD: 5.2 % (ref 0.7–7)
GFR ESTIMATION: 57.04 ML/MIN/1.73M2
GLOBULIN: 2.9 G/DL (ref 1.5–4.5)
GLUCOSE: 84 MG/DL (ref 82–115)
HCT VFR BLD AUTO: 42.2 % (ref 42–52)
HGB BLD-MCNC: 13 G/DL (ref 14–18)
IMMATURE GRANULOCYTES: 0.4 % (ref 0–0.5)
LYMPHOCYTES NFR BLD: 29.6 % (ref 19.3–53.1)
MCH RBC QN AUTO: 25.8 PG (ref 27–32)
MCHC RBC AUTO-ENTMCNC: 30.8 G/DL (ref 32–36)
MCV RBC AUTO: 83.7 FL (ref 80–94)
MONOCYTES NFR BLD: 7.3 % (ref 4.7–12.5)
NEUTROPHILS # BLD AUTO: 4.59 X 10 3/UL (ref 2.15–7.56)
NEUTROPHILS NFR BLD: 56.6 % (ref 34–71.1)
NUCLEATED RED BLOOD CELLS: 0 /100 WBC (ref 0–0.2)
PLATELET # BLD AUTO: 348 X 10 3/UL (ref 135–400)
POTASSIUM: 4 MMOL/L (ref 3.5–5.1)
PROT SNV-MCNC: 7.4 G/DL (ref 6.4–8.3)
RBC # BLD AUTO: 5.04 X 10 6/UL (ref 4.7–6.1)
RDW-SD: 47.6 FL (ref 37–54)
SODIUM: 138 MMOL/L (ref 136–145)
UREA NITROGEN (BUN): 17.7 MG/DL (ref 8–23)
WBC # BLD: 8.1 X 10 3/UL (ref 4.3–10.8)

## 2024-04-18 NOTE — TELEPHONE ENCOUNTER
----- Message from Simin Camara sent at 4/18/2024  3:13 PM CDT -----  Contact: self  [facility] is requesting a call back regarding the lab orders that were put in. Patient is not available to do labs on today or tomorrow. [facility] wanted to make sure that is fine with provider. Please call back at [phone]

## 2024-04-23 ENCOUNTER — DOCUMENT SCAN (OUTPATIENT)
Dept: HOME HEALTH SERVICES | Facility: HOSPITAL | Age: 69
End: 2024-04-23
Payer: COMMERCIAL

## 2024-04-24 ENCOUNTER — OFFICE VISIT (OUTPATIENT)
Dept: PRIMARY CARE CLINIC | Facility: CLINIC | Age: 69
End: 2024-04-24
Payer: COMMERCIAL

## 2024-04-24 VITALS
HEIGHT: 64 IN | SYSTOLIC BLOOD PRESSURE: 130 MMHG | WEIGHT: 202 LBS | BODY MASS INDEX: 34.49 KG/M2 | HEART RATE: 70 BPM | OXYGEN SATURATION: 97 % | DIASTOLIC BLOOD PRESSURE: 74 MMHG

## 2024-04-24 DIAGNOSIS — E11.22 TYPE 2 DIABETES MELLITUS WITH CHRONIC KIDNEY DISEASE, WITH LONG-TERM CURRENT USE OF INSULIN, UNSPECIFIED CKD STAGE: ICD-10-CM

## 2024-04-24 DIAGNOSIS — I50.9 CONGESTIVE HEART FAILURE, UNSPECIFIED HF CHRONICITY, UNSPECIFIED HEART FAILURE TYPE: ICD-10-CM

## 2024-04-24 DIAGNOSIS — N18.9 ACUTE KIDNEY INJURY SUPERIMPOSED ON CKD: ICD-10-CM

## 2024-04-24 DIAGNOSIS — Z74.09 IMMOBILITY: ICD-10-CM

## 2024-04-24 DIAGNOSIS — F32.A DEPRESSION, UNSPECIFIED DEPRESSION TYPE: Primary | ICD-10-CM

## 2024-04-24 DIAGNOSIS — Z79.4 TYPE 2 DIABETES MELLITUS WITH CHRONIC KIDNEY DISEASE, WITH LONG-TERM CURRENT USE OF INSULIN, UNSPECIFIED CKD STAGE: ICD-10-CM

## 2024-04-24 DIAGNOSIS — N17.9 ACUTE KIDNEY INJURY SUPERIMPOSED ON CKD: ICD-10-CM

## 2024-04-24 DIAGNOSIS — I10 PRIMARY HYPERTENSION: ICD-10-CM

## 2024-04-24 PROCEDURE — 99215 OFFICE O/P EST HI 40 MIN: CPT | Mod: S$GLB,,, | Performed by: PHYSICIAN ASSISTANT

## 2024-04-24 RX ORDER — FUROSEMIDE 40 MG/1
TABLET ORAL
COMMUNITY
Start: 2024-04-15 | End: 2024-06-04 | Stop reason: SDUPTHER

## 2024-04-24 RX ORDER — BUPROPION HYDROCHLORIDE 150 MG/1
150 TABLET ORAL DAILY
Qty: 30 TABLET | Refills: 1 | Status: SHIPPED | OUTPATIENT
Start: 2024-04-24 | End: 2024-05-08 | Stop reason: SDUPTHER

## 2024-04-24 RX ORDER — SPIRONOLACTONE 25 MG/1
TABLET ORAL
COMMUNITY
Start: 2024-04-15 | End: 2024-05-13 | Stop reason: SDUPTHER

## 2024-04-24 NOTE — PROGRESS NOTES
Subjective:      Patient ID: Jim Ryan is a 68 y.o. male.    Chief Complaint: Follow-up (PT STATES HE FEELS BETTER , STARTED PT 04/23 /PT HAS LOST 30 LBS SINCE LAST VISIT )      HPI  Patient is here today for follow-up and new complaints-    Depression - nephrology recommended that he stopped his Cymbalta and transition to new antidepressant medication.  Patient reports overall with his mood he struggles with sadness, feeling withdrawn, apathetic, low drive to do day-to-day activities.  Will do trial of Wellbutrin for 2 weeks and then follow-up with patient    Labs reviewed, trending well (see scanned):  Cr: 2.4--> 2.15--> 1.83-->1.55--> 1.56-->1.36  BUN: 42--> 37--> 35-->26-->18-->17.7  GFR: 29--> 33 --> 39-->48--> 47-->57  CO2: 14.8--> 19.7 --> 19.3-->22.9-->24.2  WBC: 20.1--> 12.4-->11.2-->8.0-->8  Hgb: 9.8--> 10.9-->10.7-->10.3-->13  HCT: 30.1 --> 34.2-->33.8-->32.1--->42.2    HTN: Reports blood pressures have improved since starting Cardizem 120mg, denies side effects.  Home /74, HR 70 today.     Diabetes:  Well controlled on current regimen  Fasting glucose this am 110   30U lantus  .5mgozempic weekly   Glucose max 230.  Patient denies signs or symptoms of highs or lows  Patient calling to schedule follow-up with Endocrine, Dr. Jerod Iqbal     Paroxysmal Afib: Inpatient Echo revealed EF of 50-55%, Grade II diastolic dysfunction. Denies chest pains or palpitations. Improvement in HR since starting Cardizem 120mg. Taking Eliquis 5mg BID as directed,   Appt with cardiology this week  - patient saw NP who suspects during his hospitalization that his pneumonia decompensated to afib and CHF.   Patient taking spironolactone 25 mg daily and Lasix 40 mg daily.  Denies any residual swelling.       SHYAM on CKD:  Resolving, renal labs nearing within normal limits     Deconditioning: at home PT 3 days a week . Transfers improving . Able to bathe and use toilet with minimal assistance.   Next goal goal to  transfer from chair to truck independently.     Review of Systems   Constitutional:  Negative for activity change, appetite change, chills, diaphoresis, fatigue and unexpected weight change.   Eyes:  Negative for visual disturbance.   Respiratory:  Negative for cough, chest tightness, shortness of breath and wheezing.    Cardiovascular:  Negative for chest pain, palpitations and leg swelling.   Gastrointestinal:  Negative for abdominal pain, constipation, nausea and vomiting.   Neurological:  Negative for dizziness, vertigo, tremors, syncope, weakness, light-headedness, numbness and headaches.   Psychiatric/Behavioral:  Negative for agitation, behavioral problems, confusion, decreased concentration, dysphoric mood, hallucinations, self-injury, sleep disturbance and suicidal ideas. The patient is not nervous/anxious and is not hyperactive.        Medication List with Changes/Refills   New Medications    BUPROPION (WELLBUTRIN XL) 150 MG TB24 TABLET    Take 1 tablet (150 mg total) by mouth once daily.   Current Medications    ALBUTEROL (PROVENTIL HFA) 90 MCG/ACTUATION INHALER    Inhale 2 puffs into the lungs every 6 (six) hours as needed for Wheezing. Rescue    ALBUTEROL (PROVENTIL) 2.5 MG /3 ML (0.083 %) NEBULIZER SOLUTION    Take 2.5 mg by nebulization 4 (four) times daily as needed.    AMITRIPTYLINE (ELAVIL) 10 MG TABLET    Take 1 tablet (10 mg total) by mouth nightly as needed for Insomnia.    APIXABAN (ELIQUIS) 5 MG TAB    Take 1 tablet (5 mg total) by mouth 2 (two) times daily.    ATORVASTATIN (LIPITOR) 40 MG TABLET    Take 1 tablet (40 mg total) by mouth every evening.    BLOOD-GLUCOSE SENSOR (FREESTYLE ALESIA 3 SENSOR) AZRA    Disp 1 free style alesia 3 with sensors    BUPRENORPHINE-NALOXONE 8-2 MG (SUBOXONE) 8-2 MG        DILTIAZEM (CARDIZEM CD) 180 MG 24 HR CAPSULE    Take 1 capsule (180 mg total) by mouth once daily.    DILTIAZEM (DILACOR XR) 120 MG CDCR    Take 1 capsule (120 mg total) by mouth once daily.     "DULOXETINE (CYMBALTA) 30 MG CAPSULE    Take 1 tablet PO in am and 2 tablets PO in PM    ERENUMAB-AOOE (AIMOVIG AUTOINJECTOR) 70 MG/ML AUTOINJECTOR    Inject 1 mL (70 mg total) into the skin every 28 days.    FLASH GLUCOSE SENSOR (FREESTYLE ALESIA 2 SENSOR) KIT    2 each by Misc.(Non-Drug; Combo Route) route every 14 (fourteen) days.    GABAPENTIN (NEURONTIN) 100 MG CAPSULE    Take 1 capsule (100 mg total) by mouth every evening.    INSULIN (LANTUS SOLOSTAR U-100 INSULIN) GLARGINE 100 UNITS/ML SUBQ PEN    Inject 25 Units into the skin once daily.    LATANOPROST 0.005 % OPHTHALMIC SOLUTION        MISCELLANEOUS MEDICAL SUPPLY KIT    [facility]    PANTOPRAZOLE (PROTONIX) 20 MG TABLET    Take 1 tablet (20 mg total) by mouth once daily.    PEN NEEDLE, DIABETIC (BD ULTRA-FINE SHORT PEN NEEDLE) 31 GAUGE X 5/16" NDLE    Use as directed    PREDNISOLONE ACETATE (PRED FORTE) 1 % DRPS    Place 1 drop into both eyes 4 (four) times daily.    SEMAGLUTIDE (OZEMPIC) 0.25 MG OR 0.5 MG (2 MG/3 ML) PEN INJECTOR    Inject 0.25 mg into the skin every 7 days.    SIMBRINZA 1-0.2 % DRPS        UBROGEPANT (UBRELVY) 100 MG TABLET    TAKE ONE TABLET BY MOUTH ONCE DAILY AS NEEDED FOR MIGRAINE MAY REPEAT DOSE AFTER 2 HOURS   Discontinued Medications    BLOOD-GLUCOSE SENSOR (DEXCOM G7 SENSOR) AZRA    1 Device by Misc.(Non-Drug; Combo Route) route every 10 days.        Objective:     Vitals:    04/24/24 1052   BP: 130/74   Pulse: 70   SpO2: 97%   Weight: 91.6 kg (202 lb)   Height: 5' 4" (1.626 m)        Physical Exam  Constitutional:       Appearance: Normal appearance. He is normal weight.   HENT:      Head: Normocephalic and atraumatic.      Nose: Nose normal.   Eyes:      Conjunctiva/sclera: Conjunctivae normal.      Comments: Eyes tracking normal on exam    Cardiovascular:      Rate and Rhythm: Normal rate and regular rhythm.      Pulses: Normal pulses.      Heart sounds: Normal heart sounds. No murmur heard.     No friction rub. " No gallop.   Pulmonary:      Effort: Pulmonary effort is normal. No respiratory distress.      Breath sounds: Normal breath sounds. No stridor. No wheezing, rhonchi or rales.   Musculoskeletal:      Right lower leg: No edema.      Left lower leg: No edema.      Comments: Wheelchair   Skin:     General: Skin is warm and dry.      Capillary Refill: Capillary refill takes less than 2 seconds.   Neurological:      Mental Status: He is alert and oriented to person, place, and time.   Psychiatric:         Mood and Affect: Mood normal.         Behavior: Behavior normal.         Thought Content: Thought content normal.         Judgment: Judgment normal.            Assessment & Plan:     Depression, unspecified depression type  -     buPROPion (WELLBUTRIN XL) 150 MG TB24 tablet; Take 1 tablet (150 mg total) by mouth once daily.  Dispense: 30 tablet; Refill: 1    Congestive heart failure, unspecified HF chronicity, unspecified heart failure type  Comments:  Lasix and spironolactone, follow-up with Cardiology as needed    Primary hypertension  Comments:  Well controlled on Cardizem    Acute kidney injury superimposed on CKD  Comments:  Labs improving, keep follow-up with Nephrology    Immobility  Comments:  Continue to work on conditioning with PT    Type 2 diabetes mellitus with chronic kidney disease, with long-term current use of insulin, unspecified CKD stage  Comments:  Glucose levels improving, follow-up with Endocrine           Total time spent with patient in room, charting, lab review equals 40 minutes.      -Patient instructed that our office calls back on all labs and imaging within 1 week of receiving the results. Patient instructed to reach out to our office if they have not heard from us so that we can request the results from the lab/imaging center           Araceli Johnston PA-C

## 2024-04-30 DIAGNOSIS — I50.9 CONGESTIVE HEART FAILURE, UNSPECIFIED HF CHRONICITY, UNSPECIFIED HEART FAILURE TYPE: Primary | ICD-10-CM

## 2024-05-01 DIAGNOSIS — I25.10 CORONARY ARTERY DISEASE, UNSPECIFIED VESSEL OR LESION TYPE, UNSPECIFIED WHETHER ANGINA PRESENT, UNSPECIFIED WHETHER NATIVE OR TRANSPLANTED HEART: ICD-10-CM

## 2024-05-01 DIAGNOSIS — I50.9 CONGESTIVE HEART FAILURE, UNSPECIFIED HF CHRONICITY, UNSPECIFIED HEART FAILURE TYPE: Primary | ICD-10-CM

## 2024-05-01 RX ORDER — SEMAGLUTIDE 1.34 MG/ML
1 INJECTION, SOLUTION SUBCUTANEOUS
Qty: 3 ML | Refills: 2 | Status: SHIPPED | OUTPATIENT
Start: 2024-05-01 | End: 2024-05-08 | Stop reason: SDUPTHER

## 2024-05-06 ENCOUNTER — DOCUMENT SCAN (OUTPATIENT)
Dept: HOME HEALTH SERVICES | Facility: HOSPITAL | Age: 69
End: 2024-05-06
Payer: COMMERCIAL

## 2024-05-06 DIAGNOSIS — I25.10 CORONARY ARTERY DISEASE, UNSPECIFIED VESSEL OR LESION TYPE, UNSPECIFIED WHETHER ANGINA PRESENT, UNSPECIFIED WHETHER NATIVE OR TRANSPLANTED HEART: Primary | ICD-10-CM

## 2024-05-06 DIAGNOSIS — Z79.899 LONG-TERM USE OF HIGH-RISK MEDICATION: ICD-10-CM

## 2024-05-08 ENCOUNTER — OFFICE VISIT (OUTPATIENT)
Dept: PRIMARY CARE CLINIC | Facility: CLINIC | Age: 69
End: 2024-05-08
Payer: COMMERCIAL

## 2024-05-08 ENCOUNTER — TELEPHONE (OUTPATIENT)
Dept: PRIMARY CARE CLINIC | Facility: CLINIC | Age: 69
End: 2024-05-08

## 2024-05-08 ENCOUNTER — PATIENT MESSAGE (OUTPATIENT)
Dept: PRIMARY CARE CLINIC | Facility: CLINIC | Age: 69
End: 2024-05-08

## 2024-05-08 DIAGNOSIS — R79.89 ELEVATED BRAIN NATRIURETIC PEPTIDE (BNP) LEVEL: ICD-10-CM

## 2024-05-08 DIAGNOSIS — E11.29 TYPE 2 DIABETES MELLITUS WITH OTHER DIABETIC KIDNEY COMPLICATION, WITH LONG-TERM CURRENT USE OF INSULIN: Primary | ICD-10-CM

## 2024-05-08 DIAGNOSIS — R53.81 PHYSICAL DECONDITIONING: ICD-10-CM

## 2024-05-08 DIAGNOSIS — G47.00 INSOMNIA, UNSPECIFIED TYPE: ICD-10-CM

## 2024-05-08 DIAGNOSIS — F32.A DEPRESSION, UNSPECIFIED DEPRESSION TYPE: ICD-10-CM

## 2024-05-08 DIAGNOSIS — Z79.4 TYPE 2 DIABETES MELLITUS WITH OTHER DIABETIC KIDNEY COMPLICATION, WITH LONG-TERM CURRENT USE OF INSULIN: Primary | ICD-10-CM

## 2024-05-08 PROCEDURE — 99214 OFFICE O/P EST MOD 30 MIN: CPT | Mod: 95,,, | Performed by: PHYSICIAN ASSISTANT

## 2024-05-08 RX ORDER — BUPROPION HYDROCHLORIDE 150 MG/1
150 TABLET ORAL DAILY
Qty: 90 TABLET | Refills: 1 | Status: SHIPPED | OUTPATIENT
Start: 2024-05-08 | End: 2024-11-04

## 2024-05-08 RX ORDER — AMITRIPTYLINE HYDROCHLORIDE 10 MG/1
20 TABLET, FILM COATED ORAL NIGHTLY PRN
Qty: 180 TABLET | Refills: 1 | Status: SHIPPED | OUTPATIENT
Start: 2024-05-08 | End: 2024-11-04

## 2024-05-08 RX ORDER — SEMAGLUTIDE 1.34 MG/ML
1 INJECTION, SOLUTION SUBCUTANEOUS
Qty: 9 ML | Refills: 1 | Status: SHIPPED | OUTPATIENT
Start: 2024-05-08 | End: 2024-11-04

## 2024-05-08 NOTE — PROGRESS NOTES
Subjective:   The patient location is: [place]  The chief complaint leading to consultation is: med followup, lab followup     Visit type: audiovisual    Face to Face time with patient: 20min doximity  40 minutes of total time spent on the encounter, which includes face to face time and non-face to face time preparing to see the patient (eg, review of tests), Obtaining and/or reviewing separately obtained history, Documenting clinical information in the electronic or other health record, Independently interpreting results (not separately reported) and communicating results to the patient/family/caregiver, or Care coordination (not separately reported).         Each patient to whom he or she provides medical services by telemedicine is:  (1) informed of the relationship between the physician and patient and the respective role of any other health care provider with respect to management of the patient; and (2) notified that he or she may decline to receive medical services by telemedicine and may withdraw from such care at any time.    Notes:         Patient ID: Jim Ryan is a 68 y.o. male.    Chief Complaint: No chief complaint on file.      HPI    Patient is here today to follow-up on chronic conditions.    Type 2 diabetes-  Patient denies any signs or symptoms of highs or lows.  He is trending his blood glucose using a continuous glucose monitor.  Patient only had 1 low at 69 and was not symptomatic.  Overall blood glucose trends are improving.  Max glucose at 2:30 a.m..  I did increase his Ozempic to 1 mg weekly.  Also takes 30 units of Lantus nightly.  He is calling to establish care with Endocrine    Cardiac-did have episode of CHF while hospitalized.  His BNP is improving.  BNP-2029 --->966---->130  Patient is going to follow up with Cardiology.  Blood pressure has been well controlled at home    Depression-doing well on Wellbutrin.  Patient reports that he feels less apathetic, less withdrawn, more energy  and motivation to do day-to-day activities.    Insomnia-patient reports amitriptyline is working well.  He is having to take 20 mg for this.  Increasing dose today.    Migraine headaches-well controlled on amovig, Needs refill     Deconditioning-patient above baseline since recent hospitalization.  Reports that he is transitioning well from his car to chair.    Review of Systems   Constitutional:  Negative for activity change and unexpected weight change.   HENT:  Negative for hearing loss, rhinorrhea and trouble swallowing.    Eyes:  Negative for discharge and visual disturbance.   Respiratory:  Negative for chest tightness and wheezing.    Cardiovascular:  Negative for chest pain and palpitations.   Gastrointestinal:  Negative for blood in stool, constipation, diarrhea and vomiting.   Endocrine: Negative for polydipsia and polyuria.   Genitourinary:  Negative for difficulty urinating, hematuria and urgency.   Musculoskeletal:  Negative for arthralgias, joint swelling and neck pain.   Neurological:  Negative for weakness and headaches.   Psychiatric/Behavioral:  Negative for confusion and dysphoric mood.        Objective:   There were no vitals filed for this visit.     Physical Exam  Constitutional:       Appearance: He is normal weight.   HENT:      Head: Normocephalic and atraumatic.      Right Ear: External ear normal.      Left Ear: External ear normal.      Nose: Nose normal.   Eyes:      Extraocular Movements: Extraocular movements intact.      Conjunctiva/sclera: Conjunctivae normal.   Pulmonary:      Effort: Pulmonary effort is normal.   Musculoskeletal:      Cervical back: Normal range of motion.   Neurological:      Mental Status: He is alert and oriented to person, place, and time.   Psychiatric:         Mood and Affect: Mood normal.         Thought Content: Thought content normal.            Assessment & Plan:     Type 2 diabetes mellitus with other diabetic kidney complication, with long-term current  use of insulin  -     semaglutide (OZEMPIC) 1 mg/dose (4 mg/3 mL); Inject 1 mg into the skin every 7 days.  Dispense: 9 mL; Refill: 1    Insomnia, unspecified type  Comments:  Well controlled on amitriptyline  Orders:  -     amitriptyline (ELAVIL) 10 MG tablet; Take 2 tablets (20 mg total) by mouth nightly as needed for Insomnia.  Dispense: 180 tablet; Refill: 1    Depression, unspecified depression type  Comments:  Patient doing well on Wellbutrin  Orders:  -     buPROPion (WELLBUTRIN XL) 150 MG TB24 tablet; Take 1 tablet (150 mg total) by mouth once daily.  Dispense: 90 tablet; Refill: 1    Physical deconditioning  Comments:  Patient has returned to baseline or better.    Elevated brain natriuretic peptide (BNP) level  Comments:  Follow-up with cardiology         Total time spent with patient, charting, review of labs equals 40 minutes.

## 2024-05-12 DIAGNOSIS — I10 PRIMARY HYPERTENSION: ICD-10-CM

## 2024-05-12 DIAGNOSIS — I48.0 PAROXYSMAL ATRIAL FIBRILLATION: ICD-10-CM

## 2024-05-13 ENCOUNTER — EXTERNAL HOME HEALTH (OUTPATIENT)
Dept: HOME HEALTH SERVICES | Facility: HOSPITAL | Age: 69
End: 2024-05-13
Payer: COMMERCIAL

## 2024-05-13 RX ORDER — DILTIAZEM HYDROCHLORIDE 120 MG/1
120 CAPSULE, EXTENDED RELEASE ORAL DAILY
Qty: 90 CAPSULE | Refills: 3 | Status: SHIPPED | OUTPATIENT
Start: 2024-05-13 | End: 2025-05-13

## 2024-05-13 RX ORDER — SPIRONOLACTONE 25 MG/1
25 TABLET ORAL DAILY
Qty: 90 TABLET | Refills: 0 | Status: SHIPPED | OUTPATIENT
Start: 2024-05-13 | End: 2024-08-11

## 2024-05-15 ENCOUNTER — DOCUMENT SCAN (OUTPATIENT)
Dept: HOME HEALTH SERVICES | Facility: HOSPITAL | Age: 69
End: 2024-05-15
Payer: COMMERCIAL

## 2024-05-16 ENCOUNTER — DOCUMENT SCAN (OUTPATIENT)
Dept: HOME HEALTH SERVICES | Facility: HOSPITAL | Age: 69
End: 2024-05-16
Payer: COMMERCIAL

## 2024-05-21 ENCOUNTER — OFFICE VISIT (OUTPATIENT)
Dept: PRIMARY CARE CLINIC | Facility: CLINIC | Age: 69
End: 2024-05-21
Payer: COMMERCIAL

## 2024-05-21 VITALS
RESPIRATION RATE: 15 BRPM | SYSTOLIC BLOOD PRESSURE: 112 MMHG | BODY MASS INDEX: 32.61 KG/M2 | DIASTOLIC BLOOD PRESSURE: 62 MMHG | WEIGHT: 191 LBS | HEART RATE: 71 BPM | HEIGHT: 64 IN | OXYGEN SATURATION: 99 %

## 2024-05-21 DIAGNOSIS — F32.A DEPRESSION, UNSPECIFIED DEPRESSION TYPE: ICD-10-CM

## 2024-05-21 DIAGNOSIS — Z79.4 TYPE 2 DIABETES MELLITUS WITH DIABETIC PERIPHERAL ANGIOPATHY WITHOUT GANGRENE, WITH LONG-TERM CURRENT USE OF INSULIN: Primary | ICD-10-CM

## 2024-05-21 DIAGNOSIS — I48.0 PAROXYSMAL ATRIAL FIBRILLATION: ICD-10-CM

## 2024-05-21 DIAGNOSIS — E11.51 TYPE 2 DIABETES MELLITUS WITH DIABETIC PERIPHERAL ANGIOPATHY WITHOUT GANGRENE, WITH LONG-TERM CURRENT USE OF INSULIN: Primary | ICD-10-CM

## 2024-05-21 DIAGNOSIS — I10 PRIMARY HYPERTENSION: ICD-10-CM

## 2024-05-21 DIAGNOSIS — G47.00 INSOMNIA, UNSPECIFIED TYPE: ICD-10-CM

## 2024-05-21 PROCEDURE — 99214 OFFICE O/P EST MOD 30 MIN: CPT | Mod: S$GLB,,, | Performed by: PHYSICIAN ASSISTANT

## 2024-05-21 RX ORDER — TIZANIDINE 4 MG/1
TABLET ORAL
COMMUNITY
Start: 2024-04-23

## 2024-05-21 RX ORDER — FERROUS SULFATE 325(65) MG
TABLET ORAL
COMMUNITY
Start: 2024-04-15

## 2024-05-21 RX ORDER — DOCUSATE SODIUM 100 MG/1
CAPSULE, LIQUID FILLED ORAL
COMMUNITY
Start: 2024-04-15

## 2024-05-21 NOTE — PROGRESS NOTES
Subjective:      Patient ID: Jim Ryan is a 68 y.o. male.    Chief Complaint: Follow-up (PT IS NEEDING REFILL ON ELLIQUIS 90 DAY SUPPLY/PT HAS BEEN DISCHARGED FROM  /EYE SX ON [facility]. E[facility] /PT HAD HOLTER MONITOR PUT ON 2 WEEKS AGO - PT STATES REPORTS TO CARDIO HAVE BEEN GOOD /FU WITH DR EDWIN JOHNSON IN JUNE )      HPI  Pt is here for followup    Type 2 diabetes- doing well on current regimen . Currently on Ozempic to 1 mg weekly and 30 units of Lantus nightly.  Seeing Dr. Newsome next mo to establish care .  Patient denies any signs or symptoms of highs or lows.  He is trending his blood glucose using a continuous glucose monitor.  Patient only had 1 low at 70 and was not symptomatic.  Overall blood glucose trends are improving.  Max glucose at 2:30 a.m..      Cardiology- Pt on holter for intermittent afib. BNP now within normal limits, episode of CHF resolved . BP well controlled.  Has followup with cards this week  Currently on -  eliquis 5mg BID  Atorvastatin 40  Lasix 40mg daily   Spironactone 25 daily  Diltaizem 120     Depression-doing well on Wellbutrin.  Patient reports that he feels less apathetic, less withdrawn, more energy and motivation to do day-to-day activities.     Insomnia-patient reports amitriptyline is working well.  He is having to take 20 mg for this.  Increasing dose today.     Migraine headaches-well controlled on amovig     Deconditioning-Pt reports he feels the strongest he has in yrs.  He has been released from home health and is feeling well     Prior SHYAM on CKD- sees Dr. Gao,  Renal fx at baseline     Back pain/insomnia-doing well on gabapentin and zanaflex    Review of Systems   Constitutional:  Negative for activity change, appetite change, chills, fatigue and fever.   HENT:  Negative for nasal congestion, facial swelling and rhinorrhea.    Eyes:  Negative for pain and visual disturbance.   Respiratory:  Negative for cough, chest tightness and shortness of breath.     Cardiovascular:  Negative for chest pain, palpitations, leg swelling and claudication.   Gastrointestinal:  Negative for abdominal distention, abdominal pain, anal bleeding, blood in stool, change in bowel habit, constipation and diarrhea.   Genitourinary:  Negative for difficulty urinating, flank pain and frequency.   Integumentary:  Negative for rash.   Neurological:  Negative for dizziness, weakness, numbness and headaches.   Psychiatric/Behavioral:  Negative for self-injury, sleep disturbance and suicidal ideas. The patient is not nervous/anxious.        Medication List with Changes/Refills   Current Medications    ALBUTEROL (PROVENTIL HFA) 90 MCG/ACTUATION INHALER    Inhale 2 puffs into the lungs every 6 (six) hours as needed for Wheezing. Rescue    ALBUTEROL (PROVENTIL) 2.5 MG /3 ML (0.083 %) NEBULIZER SOLUTION    Take 2.5 mg by nebulization 4 (four) times daily as needed.    AMITRIPTYLINE (ELAVIL) 10 MG TABLET    Take 2 tablets (20 mg total) by mouth nightly as needed for Insomnia.    ATORVASTATIN (LIPITOR) 40 MG TABLET    Take 1 tablet (40 mg total) by mouth every evening.    BLOOD-GLUCOSE SENSOR (FREESTYLE ALESIA 3 SENSOR) AZRA    Disp 1 free style alesia 3 with sensors    BUPRENORPHINE-NALOXONE 8-2 MG (SUBOXONE) 8-2 MG        BUPROPION (WELLBUTRIN XL) 150 MG TB24 TABLET    Take 1 tablet (150 mg total) by mouth once daily.    DILTIAZEM (CARDIZEM CD) 180 MG 24 HR CAPSULE    Take 1 capsule (180 mg total) by mouth once daily.    DILTIAZEM (DILACOR XR) 120 MG CDCR    Take 1 capsule (120 mg total) by mouth once daily.    DOCUSATE SODIUM (COLACE) 100 MG CAPSULE        DULOXETINE (CYMBALTA) 30 MG CAPSULE    Take 1 tablet PO in am and 2 tablets PO in PM    ERENUMAB-AOOE (AIMOVIG AUTOINJECTOR) 70 MG/ML AUTOINJECTOR    Inject 1 mL (70 mg total) into the skin every 28 days.    FERROUS SULFATE (FEOSOL) 325 MG (65 MG IRON) TAB TABLET        FLASH GLUCOSE SENSOR (FREESTYLE ALESIA 2 SENSOR) KIT    2 each by Misc.(Non-Drug;  "Combo Route) route every 14 (fourteen) days.    FUROSEMIDE (LASIX) 40 MG TABLET        GABAPENTIN (NEURONTIN) 100 MG CAPSULE    Take 1 capsule (100 mg total) by mouth every evening.    INSULIN (LANTUS SOLOSTAR U-100 INSULIN) GLARGINE 100 UNITS/ML SUBQ PEN    Inject 25 Units into the skin once daily.    LATANOPROST 0.005 % OPHTHALMIC SOLUTION        [employer] KIT    [facility]    PANTOPRAZOLE (PROTONIX) 20 MG TABLET    Take 1 tablet (20 mg total) by mouth once daily.    PEN NEEDLE, DIABETIC (BD ULTRA-FINE SHORT PEN NEEDLE) 31 GAUGE X 5/16" NDLE    Use as directed    SEMAGLUTIDE (OZEMPIC) 1 MG/DOSE (4 MG/3 ML)    Inject 1 mg into the skin every 7 days.    SIMBRINZA 1-0.2 % DRPS        SPIRONOLACTONE (ALDACTONE) 25 MG TABLET    Take 1 tablet (25 mg total) by mouth once daily.    TIZANIDINE (ZANAFLEX) 4 MG TABLET        UBROGEPANT (UBRELVY) 100 MG TABLET    TAKE ONE TABLET BY MOUTH ONCE DAILY AS NEEDED FOR MIGRAINE MAY REPEAT DOSE AFTER 2 HOURS   Changed and/or Refilled Medications    Modified Medication Previous Medication    APIXABAN (ELIQUIS) 5 MG TAB apixaban (ELIQUIS) 5 mg Tab       Take 1 tablet (5 mg total) by mouth 2 (two) times daily.    Take 1 tablet (5 mg total) by mouth 2 (two) times daily.        Objective:     Vitals:    05/21/24 0903   BP: 112/62   Pulse: 71   Resp: 15   SpO2: 99%   Weight: 86.6 kg (191 lb)   Height: 5' 4" (1.626 m)        Physical Exam   Physical Exam  Constitutional:       Appearance: Normal appearance. He is normal weight.   HENT:      Head: Normocephalic and atraumatic.      Nose: Nose normal.   Eyes:      Conjunctiva/sclera: Conjunctivae normal.      Comments: Eyes tracking normal on exam    Cardiovascular:      Rate and Rhythm: Normal rate and regular rhythm.      Pulses: Normal pulses.      Heart sounds: Normal heart sounds. No murmur heard.     No friction rub. No gallop.   Pulmonary:      Effort: Pulmonary effort is normal. No respiratory " distress.      Breath sounds: Normal breath sounds. No stridor. No wheezing, rhonchi or rales.   Musculoskeletal:      Right lower leg: No edema.      Left lower leg: No edema.      Comments: Wheelchair   Skin:     General: Skin is warm and dry.      Capillary Refill: Capillary refill takes less than 2 seconds.   Neurological:      Mental Status: He is alert and oriented to person, place, and time.   Psychiatric:         Mood and Affect: Mood normal.         Behavior: Behavior normal.         Thought Content: Thought content normal.         Judgment: Judgment normal.     Assessment & Plan:     Type 2 diabetes mellitus with diabetic peripheral angiopathy without gangrene, with long-term current use of insulin  Comments:  decrease pm lantus to 28 units due to recent lows    Paroxysmal atrial fibrillation  Comments:  HR improved on Cardizem XR 120mg once daily  Refill of Eliquis 5mg BID  Keep Cardiology f/u  Contact cardiology for monitor prior to next scheduled appointment.  Orders:  -     apixaban (ELIQUIS) 5 mg Tab; Take 1 tablet (5 mg total) by mouth 2 (two) times daily.  Dispense: 180 tablet; Refill: 1    Depression, unspecified depression type  Comments:  continue wellbutrin    Primary hypertension  Comments:  well controlled on current    Insomnia, unspecified type  Comments:  well controlled on current           3 mo followup       -Patient instructed that our office calls back on all labs and imaging within 1 week of receiving the results. Patient instructed to reach out to our office if they have not heard from us so that we can request the results from the lab/imaging center           Araceli Johnston PA-C

## 2024-05-22 ENCOUNTER — DOCUMENT SCAN (OUTPATIENT)
Dept: HOME HEALTH SERVICES | Facility: HOSPITAL | Age: 69
End: 2024-05-22
Payer: COMMERCIAL

## 2024-05-29 ENCOUNTER — DOCUMENT SCAN (OUTPATIENT)
Dept: HOME HEALTH SERVICES | Facility: HOSPITAL | Age: 69
End: 2024-05-29
Payer: COMMERCIAL

## 2024-06-04 ENCOUNTER — PATIENT MESSAGE (OUTPATIENT)
Dept: PRIMARY CARE CLINIC | Facility: CLINIC | Age: 69
End: 2024-06-04
Payer: COMMERCIAL

## 2024-06-04 RX ORDER — FUROSEMIDE 40 MG/1
40 TABLET ORAL DAILY
Qty: 90 TABLET | Refills: 3 | Status: SHIPPED | OUTPATIENT
Start: 2024-06-04

## 2024-06-10 ENCOUNTER — PATIENT MESSAGE (OUTPATIENT)
Dept: PRIMARY CARE CLINIC | Facility: CLINIC | Age: 69
End: 2024-06-10
Payer: COMMERCIAL

## 2024-06-11 ENCOUNTER — PATIENT MESSAGE (OUTPATIENT)
Dept: PRIMARY CARE CLINIC | Facility: CLINIC | Age: 69
End: 2024-06-11
Payer: COMMERCIAL

## 2024-06-11 ENCOUNTER — TELEPHONE (OUTPATIENT)
Dept: FAMILY MEDICINE | Facility: CLINIC | Age: 69
End: 2024-06-11
Payer: COMMERCIAL

## 2024-06-11 DIAGNOSIS — E11.51 TYPE 2 DIABETES MELLITUS WITH DIABETIC PERIPHERAL ANGIOPATHY WITHOUT GANGRENE, WITH LONG-TERM CURRENT USE OF INSULIN: ICD-10-CM

## 2024-06-11 DIAGNOSIS — G43.109 MIGRAINE WITH AURA AND WITHOUT STATUS MIGRAINOSUS, NOT INTRACTABLE: ICD-10-CM

## 2024-06-11 DIAGNOSIS — Z79.4 TYPE 2 DIABETES MELLITUS WITH DIABETIC PERIPHERAL ANGIOPATHY WITHOUT GANGRENE, WITH LONG-TERM CURRENT USE OF INSULIN: ICD-10-CM

## 2024-06-11 RX ORDER — BLOOD-GLUCOSE SENSOR
EACH MISCELLANEOUS
Qty: 1 EACH | Refills: 5 | Status: SHIPPED | OUTPATIENT
Start: 2024-06-11

## 2024-06-11 RX ORDER — ERENUMAB-AOOE 70 MG/ML
70 INJECTION SUBCUTANEOUS
Qty: 1 ML | Refills: 11 | Status: SHIPPED | OUTPATIENT
Start: 2024-06-11

## 2024-06-12 ENCOUNTER — PATIENT OUTREACH (OUTPATIENT)
Dept: ADMINISTRATIVE | Facility: HOSPITAL | Age: 69
End: 2024-06-12
Payer: COMMERCIAL

## 2024-06-12 ENCOUNTER — PATIENT MESSAGE (OUTPATIENT)
Dept: PRIMARY CARE CLINIC | Facility: CLINIC | Age: 69
End: 2024-06-12
Payer: COMMERCIAL

## 2024-06-12 ENCOUNTER — TELEPHONE (OUTPATIENT)
Dept: PRIMARY CARE CLINIC | Facility: CLINIC | Age: 69
End: 2024-06-12
Payer: COMMERCIAL

## 2024-06-12 DIAGNOSIS — E11.51 TYPE 2 DIABETES MELLITUS WITH DIABETIC PERIPHERAL ANGIOPATHY WITHOUT GANGRENE, WITH LONG-TERM CURRENT USE OF INSULIN: ICD-10-CM

## 2024-06-12 DIAGNOSIS — Z79.4 TYPE 2 DIABETES MELLITUS WITH DIABETIC PERIPHERAL ANGIOPATHY WITHOUT GANGRENE, WITH LONG-TERM CURRENT USE OF INSULIN: ICD-10-CM

## 2024-06-17 ENCOUNTER — TELEPHONE (OUTPATIENT)
Dept: FAMILY MEDICINE | Facility: CLINIC | Age: 69
End: 2024-06-17
Payer: COMMERCIAL

## 2024-07-01 PROBLEM — N18.9 ACUTE KIDNEY INJURY SUPERIMPOSED ON CKD: Status: RESOLVED | Noted: 2024-03-28 | Resolved: 2024-07-01

## 2024-07-01 PROBLEM — N17.9 ACUTE KIDNEY INJURY SUPERIMPOSED ON CKD: Status: RESOLVED | Noted: 2024-03-28 | Resolved: 2024-07-01

## 2024-07-05 ENCOUNTER — PATIENT MESSAGE (OUTPATIENT)
Dept: PRIMARY CARE CLINIC | Facility: CLINIC | Age: 69
End: 2024-07-05
Payer: COMMERCIAL

## 2024-07-08 ENCOUNTER — PATIENT MESSAGE (OUTPATIENT)
Dept: PRIMARY CARE CLINIC | Facility: CLINIC | Age: 69
End: 2024-07-08
Payer: COMMERCIAL

## 2024-07-10 DIAGNOSIS — G47.00 INSOMNIA, UNSPECIFIED TYPE: ICD-10-CM

## 2024-07-10 DIAGNOSIS — G62.9 POLYNEUROPATHY: ICD-10-CM

## 2024-07-10 RX ORDER — AMITRIPTYLINE HYDROCHLORIDE 50 MG/1
50 TABLET, FILM COATED ORAL NIGHTLY PRN
Qty: 90 TABLET | Refills: 1 | Status: SHIPPED | OUTPATIENT
Start: 2024-07-10 | End: 2025-01-06

## 2024-07-10 RX ORDER — GABAPENTIN 300 MG/1
300 CAPSULE ORAL NIGHTLY
Qty: 90 CAPSULE | Refills: 3 | Status: SHIPPED | OUTPATIENT
Start: 2024-07-10 | End: 2025-07-10

## 2024-07-15 ENCOUNTER — TELEPHONE (OUTPATIENT)
Dept: FAMILY MEDICINE | Facility: CLINIC | Age: 69
End: 2024-07-15
Payer: COMMERCIAL

## 2024-07-15 ENCOUNTER — OUTSIDE PLACE OF SERVICE (OUTPATIENT)
Dept: CARDIOTHORACIC SURGERY | Facility: CLINIC | Age: 69
End: 2024-07-15
Payer: COMMERCIAL

## 2024-07-15 ENCOUNTER — PATIENT MESSAGE (OUTPATIENT)
Dept: FAMILY MEDICINE | Facility: CLINIC | Age: 69
End: 2024-07-15
Payer: COMMERCIAL

## 2024-07-15 NOTE — TELEPHONE ENCOUNTER
spoke to wife to see how patient is doing today after recent er visit, she said he's spitting up table spoons of blood when he coughs, and he feels really really bad today. he was having L. sided chest pains, labs were ok, and cxr cameback ok.  Advised patient to go to ER

## 2024-07-17 ENCOUNTER — OUTSIDE PLACE OF SERVICE (OUTPATIENT)
Dept: INTERVENTIONAL RADIOLOGY/VASCULAR | Facility: CLINIC | Age: 69
End: 2024-07-17
Payer: COMMERCIAL

## 2024-07-29 ENCOUNTER — OUTSIDE PLACE OF SERVICE (OUTPATIENT)
Dept: INTERVENTIONAL RADIOLOGY/VASCULAR | Facility: CLINIC | Age: 69
End: 2024-07-29
Payer: COMMERCIAL

## 2024-07-29 ENCOUNTER — OUTSIDE PLACE OF SERVICE (OUTPATIENT)
Dept: PULMONOLOGY | Facility: CLINIC | Age: 69
End: 2024-07-29
Payer: COMMERCIAL

## 2024-07-29 PROCEDURE — 99291 CRITICAL CARE FIRST HOUR: CPT | Mod: ,,, | Performed by: INTERNAL MEDICINE

## 2024-07-30 ENCOUNTER — OUTSIDE PLACE OF SERVICE (OUTPATIENT)
Dept: PULMONOLOGY | Facility: CLINIC | Age: 69
End: 2024-07-30
Payer: COMMERCIAL

## 2024-07-30 PROCEDURE — 99291 CRITICAL CARE FIRST HOUR: CPT | Mod: ,,, | Performed by: INTERNAL MEDICINE
